# Patient Record
Sex: MALE | Race: WHITE | NOT HISPANIC OR LATINO
[De-identification: names, ages, dates, MRNs, and addresses within clinical notes are randomized per-mention and may not be internally consistent; named-entity substitution may affect disease eponyms.]

---

## 2017-09-27 ENCOUNTER — FORM ENCOUNTER (OUTPATIENT)
Age: 72
End: 2017-09-27

## 2017-09-28 ENCOUNTER — APPOINTMENT (OUTPATIENT)
Dept: ORTHOPEDIC SURGERY | Facility: CLINIC | Age: 72
End: 2017-09-28
Payer: MEDICAID

## 2017-09-28 ENCOUNTER — OUTPATIENT (OUTPATIENT)
Dept: OUTPATIENT SERVICES | Facility: HOSPITAL | Age: 72
LOS: 1 days | End: 2017-09-28
Payer: MEDICARE

## 2017-09-28 VITALS
WEIGHT: 195 LBS | SYSTOLIC BLOOD PRESSURE: 170 MMHG | BODY MASS INDEX: 31.34 KG/M2 | HEIGHT: 66 IN | DIASTOLIC BLOOD PRESSURE: 110 MMHG

## 2017-09-28 DIAGNOSIS — M65.9 SYNOVITIS AND TENOSYNOVITIS, UNSPECIFIED: ICD-10-CM

## 2017-09-28 DIAGNOSIS — Z87.39 PERSONAL HISTORY OF OTHER DISEASES OF THE MUSCULOSKELETAL SYSTEM AND CONNECTIVE TISSUE: ICD-10-CM

## 2017-09-28 DIAGNOSIS — Z87.19 PERSONAL HISTORY OF OTHER DISEASES OF THE DIGESTIVE SYSTEM: ICD-10-CM

## 2017-09-28 DIAGNOSIS — E78.5 HYPERLIPIDEMIA, UNSPECIFIED: ICD-10-CM

## 2017-09-28 DIAGNOSIS — I10 ESSENTIAL (PRIMARY) HYPERTENSION: ICD-10-CM

## 2017-09-28 DIAGNOSIS — Z89.612 ACQUIRED ABSENCE OF LEFT LEG ABOVE KNEE: ICD-10-CM

## 2017-09-28 DIAGNOSIS — Z85.118 PERSONAL HISTORY OF OTHER MALIGNANT NEOPLASM OF BRONCHUS AND LUNG: ICD-10-CM

## 2017-09-28 PROCEDURE — 20610 DRAIN/INJ JOINT/BURSA W/O US: CPT | Mod: RT

## 2017-09-28 PROCEDURE — 99203 OFFICE O/P NEW LOW 30 MIN: CPT | Mod: 25

## 2017-09-28 PROCEDURE — 73564 X-RAY EXAM KNEE 4 OR MORE: CPT

## 2017-09-28 PROCEDURE — 73564 X-RAY EXAM KNEE 4 OR MORE: CPT | Mod: 26,RT

## 2017-10-02 PROBLEM — I10 HYPERTENSION: Status: ACTIVE | Noted: 2017-09-28

## 2017-10-02 PROBLEM — E78.5 HYPERLIPIDEMIA: Status: ACTIVE | Noted: 2017-09-28

## 2017-10-02 PROBLEM — M65.9 SYNOVITIS: Status: ACTIVE | Noted: 2017-10-02

## 2017-10-02 PROBLEM — Z87.19 HISTORY OF DUODENAL ULCER: Status: RESOLVED | Noted: 2017-09-28 | Resolved: 2017-10-02

## 2017-10-02 PROBLEM — Z85.118 HISTORY OF MALIGNANT NEOPLASM OF LUNG: Status: RESOLVED | Noted: 2017-09-28 | Resolved: 2017-10-02

## 2017-10-02 PROBLEM — Z89.612 AMPUTATED LEFT LEG: Status: RESOLVED | Noted: 2017-09-28 | Resolved: 2017-10-02

## 2017-10-02 PROBLEM — Z87.39 HISTORY OF GOUT: Status: RESOLVED | Noted: 2017-09-28 | Resolved: 2017-10-02

## 2017-10-02 RX ORDER — LABETALOL HYDROCHLORIDE 200 MG/1
200 TABLET, FILM COATED ORAL
Qty: 90 | Refills: 0 | Status: ACTIVE | COMMUNITY
Start: 2017-01-04

## 2017-10-02 RX ORDER — ALLOPURINOL 300 MG/1
300 TABLET ORAL
Qty: 90 | Refills: 0 | Status: ACTIVE | COMMUNITY
Start: 2017-01-04

## 2017-10-02 RX ORDER — TAMSULOSIN HYDROCHLORIDE 0.4 MG/1
0.4 CAPSULE ORAL
Qty: 90 | Refills: 0 | Status: ACTIVE | COMMUNITY
Start: 2017-06-09

## 2017-10-02 RX ORDER — ATORVASTATIN CALCIUM 10 MG/1
10 TABLET, FILM COATED ORAL
Qty: 90 | Refills: 0 | Status: ACTIVE | COMMUNITY
Start: 2017-06-07

## 2017-10-11 ENCOUNTER — RX RENEWAL (OUTPATIENT)
Age: 72
End: 2017-10-11

## 2017-10-20 ENCOUNTER — APPOINTMENT (OUTPATIENT)
Dept: ORTHOPEDIC SURGERY | Facility: CLINIC | Age: 72
End: 2017-10-20
Payer: MEDICAID

## 2017-10-20 VITALS — WEIGHT: 195 LBS | HEIGHT: 66 IN | BODY MASS INDEX: 31.34 KG/M2

## 2017-10-20 PROCEDURE — 99214 OFFICE O/P EST MOD 30 MIN: CPT

## 2017-11-03 ENCOUNTER — APPOINTMENT (OUTPATIENT)
Dept: ORTHOPEDIC SURGERY | Facility: CLINIC | Age: 72
End: 2017-11-03
Payer: MEDICARE

## 2017-11-03 PROCEDURE — 20610 DRAIN/INJ JOINT/BURSA W/O US: CPT | Mod: RT

## 2017-11-03 RX ORDER — HYALURONATE SOD, CROSS-LINKED 30 MG/3 ML
30 SYRINGE (ML) INTRAARTICULAR
Qty: 3 | Refills: 0 | Status: COMPLETED | COMMUNITY
Start: 2017-10-23 | End: 2017-11-03

## 2017-11-28 ENCOUNTER — APPOINTMENT (OUTPATIENT)
Dept: ORTHOPEDIC SURGERY | Facility: CLINIC | Age: 72
End: 2017-11-28
Payer: MEDICARE

## 2017-11-28 VITALS
DIASTOLIC BLOOD PRESSURE: 90 MMHG | WEIGHT: 195 LBS | HEIGHT: 66 IN | SYSTOLIC BLOOD PRESSURE: 140 MMHG | BODY MASS INDEX: 31.34 KG/M2

## 2017-11-28 PROCEDURE — 99214 OFFICE O/P EST MOD 30 MIN: CPT

## 2018-01-18 ENCOUNTER — FORM ENCOUNTER (OUTPATIENT)
Age: 73
End: 2018-01-18

## 2018-01-19 ENCOUNTER — APPOINTMENT (OUTPATIENT)
Dept: ORTHOPEDIC SURGERY | Facility: CLINIC | Age: 73
End: 2018-01-19
Payer: MEDICARE

## 2018-01-19 ENCOUNTER — OUTPATIENT (OUTPATIENT)
Dept: OUTPATIENT SERVICES | Facility: HOSPITAL | Age: 73
LOS: 1 days | End: 2018-01-19
Payer: MEDICARE

## 2018-01-19 VITALS
BODY MASS INDEX: 31.98 KG/M2 | WEIGHT: 199 LBS | SYSTOLIC BLOOD PRESSURE: 120 MMHG | DIASTOLIC BLOOD PRESSURE: 80 MMHG | HEIGHT: 66 IN

## 2018-01-19 PROCEDURE — 99214 OFFICE O/P EST MOD 30 MIN: CPT

## 2018-01-19 PROCEDURE — 73564 X-RAY EXAM KNEE 4 OR MORE: CPT

## 2018-01-19 PROCEDURE — 73564 X-RAY EXAM KNEE 4 OR MORE: CPT | Mod: 26,RT

## 2018-01-19 RX ORDER — AMOXICILLIN 500 MG/1
500 CAPSULE ORAL
Qty: 56 | Refills: 0 | Status: DISCONTINUED | COMMUNITY
Start: 2017-08-21

## 2018-01-19 RX ORDER — CLARITHROMYCIN 500 MG/1
500 TABLET, FILM COATED ORAL
Qty: 28 | Refills: 0 | Status: DISCONTINUED | COMMUNITY
Start: 2017-08-21

## 2018-03-16 ENCOUNTER — APPOINTMENT (OUTPATIENT)
Dept: ORTHOPEDIC SURGERY | Facility: CLINIC | Age: 73
End: 2018-03-16
Payer: MEDICARE

## 2018-03-16 PROCEDURE — 99214 OFFICE O/P EST MOD 30 MIN: CPT | Mod: 25

## 2018-03-16 PROCEDURE — 20610 DRAIN/INJ JOINT/BURSA W/O US: CPT | Mod: RT

## 2018-05-25 ENCOUNTER — RX RENEWAL (OUTPATIENT)
Age: 73
End: 2018-05-25

## 2018-06-08 RX ORDER — HYALURONATE SOD, CROSS-LINKED 30 MG/3 ML
30 SYRINGE (ML) INTRAARTICULAR
Qty: 3 | Refills: 0 | Status: COMPLETED | COMMUNITY
Start: 2018-05-25 | End: 2018-06-08

## 2018-07-06 ENCOUNTER — APPOINTMENT (OUTPATIENT)
Dept: ORTHOPEDIC SURGERY | Facility: CLINIC | Age: 73
End: 2018-07-06
Payer: MEDICARE

## 2018-07-06 VITALS — WEIGHT: 200 LBS | HEIGHT: 66 IN | BODY MASS INDEX: 32.14 KG/M2

## 2018-07-06 PROCEDURE — 99214 OFFICE O/P EST MOD 30 MIN: CPT

## 2018-09-19 ENCOUNTER — APPOINTMENT (OUTPATIENT)
Dept: ORTHOPEDIC SURGERY | Facility: CLINIC | Age: 73
End: 2018-09-19
Payer: MEDICARE

## 2018-09-19 PROCEDURE — 20610 DRAIN/INJ JOINT/BURSA W/O US: CPT | Mod: RT

## 2018-11-21 ENCOUNTER — APPOINTMENT (OUTPATIENT)
Dept: UROLOGY | Facility: CLINIC | Age: 73
End: 2018-11-21
Payer: MEDICARE

## 2018-11-21 VITALS — SYSTOLIC BLOOD PRESSURE: 158 MMHG | HEART RATE: 93 BPM | TEMPERATURE: 98.6 F | DIASTOLIC BLOOD PRESSURE: 82 MMHG

## 2018-11-21 DIAGNOSIS — R36.1 HEMATOSPERMIA: ICD-10-CM

## 2018-11-21 DIAGNOSIS — N40.0 BENIGN PROSTATIC HYPERPLASIA WITHOUT LOWER URINARY TRACT SYMPMS: ICD-10-CM

## 2018-11-21 PROCEDURE — 99204 OFFICE O/P NEW MOD 45 MIN: CPT

## 2018-11-25 LAB
APPEARANCE: CLEAR
BACTERIA UR CULT: NORMAL
BACTERIA: NEGATIVE
BILIRUBIN URINE: NEGATIVE
BLOOD URINE: NEGATIVE
COLOR: YELLOW
GLUCOSE QUALITATIVE U: NEGATIVE MG/DL
HYALINE CASTS: 0 /LPF
KETONES URINE: NEGATIVE
LEUKOCYTE ESTERASE URINE: NEGATIVE
MICROSCOPIC-UA: NORMAL
NITRITE URINE: NEGATIVE
PH URINE: 5.5
PROTEIN URINE: NEGATIVE MG/DL
PSA FREE FLD-MCNC: 29.7
PSA FREE SERPL-MCNC: 0.9 NG/ML
PSA SERPL-MCNC: 3.03 NG/ML
RED BLOOD CELLS URINE: 0 /HPF
SPECIFIC GRAVITY URINE: 1.01
SQUAMOUS EPITHELIAL CELLS: 0 /HPF
UROBILINOGEN URINE: NEGATIVE MG/DL
WHITE BLOOD CELLS URINE: 0 /HPF

## 2018-11-26 ENCOUNTER — RESULT REVIEW (OUTPATIENT)
Age: 73
End: 2018-11-26

## 2018-11-28 ENCOUNTER — MOBILE ON CALL (OUTPATIENT)
Age: 73
End: 2018-11-28

## 2019-01-09 ENCOUNTER — OTHER (OUTPATIENT)
Age: 74
End: 2019-01-09

## 2019-02-21 ENCOUNTER — RX RENEWAL (OUTPATIENT)
Age: 74
End: 2019-02-21

## 2019-03-11 ENCOUNTER — APPOINTMENT (OUTPATIENT)
Dept: UROLOGY | Facility: CLINIC | Age: 74
End: 2019-03-11
Payer: MEDICARE

## 2019-03-11 VITALS — DIASTOLIC BLOOD PRESSURE: 84 MMHG | TEMPERATURE: 98.1 F | SYSTOLIC BLOOD PRESSURE: 164 MMHG | HEART RATE: 76 BPM

## 2019-03-11 DIAGNOSIS — N52.9 MALE ERECTILE DYSFUNCTION, UNSPECIFIED: ICD-10-CM

## 2019-03-11 PROCEDURE — 99213 OFFICE O/P EST LOW 20 MIN: CPT

## 2019-03-11 RX ORDER — SILDENAFIL 50 MG/1
50 TABLET ORAL
Qty: 6 | Refills: 5 | Status: DISCONTINUED | COMMUNITY
Start: 2019-01-09 | End: 2019-03-11

## 2019-03-11 NOTE — HISTORY OF PRESENT ILLNESS
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 No painful ejaculation, no hematuria, dysuria. Unknown last PSA, had a negative TRUS biopsy in 2015 but complicated by severe sepsis with prolonged [facility] admission under Dr. Alex. Overall feels well but concerned about the blood in semen, previously saw Dr. Luque for the same complaint who offered reassurance but is here for second opinion.\par One episode of kidney stone, passed on its own - 10 years ago, overall satisfied with his urination, some weaker stream, post-void dribbling and nocturia but no frequency, urgency or incontinence.\par No history of  cancers.\par \par 3/11/19 Here for f/u. Last seen in November. Never filled Viagra due to cost. PSA was 3.03. No further hematospermia but experiencing retrograde ejaculation likely from tamsulosin. LUTS well controlled. [Nocturia] : no nocturia [Erectile Dysfunction] : Erectile Dysfunction [None] : None

## 2019-03-11 NOTE — LETTER BODY
[Dear  ___] : Dear  [unfilled], [Courtesy Letter:] : I had the pleasure of seeing your patient, [unfilled], in my office today. [Please see my note below.] : Please see my note below. [Consult Closing:] : Thank you very much for allowing me to participate in the care of this patient.  If you have any questions, please do not hesitate to contact me. [Sincerely,] : Sincerely, [FreeTextEntry2] : Jean Wong MD\par [address] [facility], [address]\par [place], [place] [id number] [FreeTextEntry3] : Rhys Hebert MD\par Urologic Oncology\par Department of Urology\par [facility]\par \par [employer] [facility] [employer] [facility] \par \par

## 2019-03-11 NOTE — ASSESSMENT
[FreeTextEntry1] : 73M hx sarcoma, LLE prosthesis, BPH, Elevated PSA s/p negative biopsy (2015) c/b severe sepsis now with hematospermia - likely benign etiology\par Repeat PSA today\par Discussed retrograde ejaculation as caused by tamsulosin. He would like to continue medication for now\par Sildenafil 20mg prescribed \par F/u 6 months or sooner prn

## 2019-03-11 NOTE — PHYSICAL EXAM
[General Appearance - Well Developed] : well developed [General Appearance - Well Nourished] : well nourished [Normal Appearance] : normal appearance [Well Groomed] : well groomed [General Appearance - In No Acute Distress] : no acute distress [Abdomen Soft] : soft [Abdomen Tenderness] : non-tender [Costovertebral Angle Tenderness] : no ~M costovertebral angle tenderness [Oriented To Time, Place, And Person] : oriented to person, place, and time [Affect] : the affect was normal [Mood] : the mood was normal [Not Anxious] : not anxious [FreeTextEntry1] : left leg prosthesis [No Focal Deficits] : no focal deficits

## 2019-03-12 LAB
PSA FREE FLD-MCNC: 27 %
PSA FREE SERPL-MCNC: 0.95 NG/ML
PSA SERPL-MCNC: 3.48 NG/ML

## 2019-03-14 ENCOUNTER — RESULT REVIEW (OUTPATIENT)
Age: 74
End: 2019-03-14

## 2019-03-29 ENCOUNTER — APPOINTMENT (OUTPATIENT)
Dept: ORTHOPEDIC SURGERY | Facility: CLINIC | Age: 74
End: 2019-03-29
Payer: MEDICARE

## 2019-03-29 VITALS — BODY MASS INDEX: 31.34 KG/M2 | WEIGHT: 195 LBS | HEIGHT: 66 IN

## 2019-03-29 DIAGNOSIS — M75.41 IMPINGEMENT SYNDROME OF RIGHT SHOULDER: ICD-10-CM

## 2019-03-29 PROCEDURE — 99214 OFFICE O/P EST MOD 30 MIN: CPT

## 2019-03-29 RX ORDER — CANDESARTAN CILEXETIL 32 MG/1
32 TABLET ORAL
Refills: 0 | Status: ACTIVE | COMMUNITY

## 2019-03-29 RX ORDER — CANDESARTAN CILEXETIL 32 MG/1
32 TABLET ORAL
Qty: 90 | Refills: 0 | Status: DISCONTINUED | COMMUNITY
Start: 2017-06-07 | End: 2019-03-29

## 2019-03-29 RX ORDER — HYALURONATE SOD, CROSS-LINKED 30 MG/3 ML
30 SYRINGE (ML) INTRAARTICULAR
Refills: 0 | Status: DISCONTINUED | COMMUNITY
End: 2019-03-29

## 2019-03-29 RX ORDER — HYALURONATE SOD, CROSS-LINKED 30 MG/3 ML
30 SYRINGE (ML) INTRAARTICULAR
Qty: 1 | Refills: 0 | Status: DISCONTINUED | COMMUNITY
Start: 2019-02-21 | End: 2019-03-29

## 2019-04-04 NOTE — HISTORY OF PRESENT ILLNESS
[de-identified] : Mr. Barnes developed pain in her nondominant left shoulder 3 weeks ago without a specific injury. He denies night pain but does have pain with activities of daily living requiring overhead use. [place] currently takes no medicines for pain.

## 2019-04-04 NOTE — CONSULT LETTER
[Dear  ___] : Dear  [unfilled], [FreeTextEntry1] : Today I had the pleasure of evaluating your very nice patient QUINTEN KELLER  who requested that I share my findings with you. I very much appreciate the referral. \par \par Please review my office note below and, needless to say, please call or email me with any questions or concerns.\par \par I appreciate the opportunity to participate in his care.\par \par Sincerely,\par \par Jean Alvarez MD\par Director, Orthopaedic Surgery\par and Orthopaedic Strategic Initiatives \par [facility]\par Office: [phone]\par Cell: [phone]\par Email: [email or web address]\par Website: [email or web address] \par \par \par \par \par

## 2019-04-04 NOTE — DISCUSSION/SUMMARY
[Medication Risks Reviewed] : Medication risks reviewed [Surgical risks reviewed] : Surgical risks reviewed [de-identified] : Using a plastic shoulder model, I reviewed the anatomy of the rotator cuff muscles and the glenohumeral joint. I then explained the pathophysiology of impingement syndrome, commonly referred to as bursitis of the shoulder, and the fact that 80% of patients respond to nonoperative treatment.\par \par I then reviewed the nonoperative treatment program which includes activity modification to avoid those activities that exacerbate shoulder pain, Tylenol or anti-inflammatory medications as necessary, rehabilitation exercises focusing on shoulder stretching and rotator cuff muscle strengthening, either independently or with a physical therapist, and, a subacromial cortisone injection for those patients who remain symptomatic following an exercise program. \par \par Finally, I explained that those patients who remain symptomatic after a minimum of 3-4 months of nonoperative treatment and continue to have shoulder pain at night that interrupts sleep and compromises activities of everyday life or sporting activities requiring overhead use are candidates for surgical treatment.\par \par I then gave instructions in a home exercise program for the rotator cuff muscles emphasizing stretching and strengthening of the shoulder and also gave my home exercise sheet which includes illustrations of the exercises.\par I advised him will to take Aleve and Tylenol as necessary for pain relief and to return to my repeat evaluation in 6 weeks if she remains symptomatic at which time I would recommend a subacromial cortisone injection.\par \par Today her clinical left shoulder [employer] score is 56 on a scale of 100 indicating poor shoulder function.\par \par

## 2019-04-30 ENCOUNTER — APPOINTMENT (OUTPATIENT)
Dept: ORTHOPEDIC SURGERY | Facility: CLINIC | Age: 74
End: 2019-04-30
Payer: MEDICARE

## 2019-04-30 PROCEDURE — 20610 DRAIN/INJ JOINT/BURSA W/O US: CPT | Mod: RT

## 2019-05-06 NOTE — PROCEDURE
[de-identified] : Indication: right knee pain and stiffness\par DX: Right knee DJD \par \par Under strict sterile technique,  the right  knee was prepped with Betadine. Using the superolateral approach, with the patient supine, a 4mL injection of GelOne was administered intra-articularly. The patient tolerated the procedure well. The patient was instructed to avoid vigorous exercise for 48 hours and will apply ice to the knee for 20 minutes 2-3 times per day if discomfort occurs. Patient will return on an as needed basis. The patient will call if any questions or problems should arise. \par \par Gel- One injection - Right Knee\par Lot #: [id number]\par Exp: 02-\par Man:  Demetrius\par NDC: 05218-20500-0

## 2019-09-04 ENCOUNTER — APPOINTMENT (OUTPATIENT)
Dept: UROLOGY | Facility: CLINIC | Age: 74
End: 2019-09-04

## 2019-10-15 ENCOUNTER — RX RENEWAL (OUTPATIENT)
Age: 74
End: 2019-10-15

## 2019-11-08 ENCOUNTER — OUTPATIENT (OUTPATIENT)
Dept: OUTPATIENT SERVICES | Facility: HOSPITAL | Age: 74
LOS: 1 days | End: 2019-11-08
Payer: MEDICARE

## 2019-11-08 PROCEDURE — 73030 X-RAY EXAM OF SHOULDER: CPT | Mod: 26,RT

## 2019-11-08 PROCEDURE — 73030 X-RAY EXAM OF SHOULDER: CPT

## 2019-11-14 ENCOUNTER — APPOINTMENT (OUTPATIENT)
Dept: ORTHOPEDIC SURGERY | Facility: CLINIC | Age: 74
End: 2019-11-14
Payer: MEDICARE

## 2019-11-14 PROCEDURE — 20610 DRAIN/INJ JOINT/BURSA W/O US: CPT | Mod: RT

## 2019-11-14 RX ORDER — HYALURONATE SOD, CROSS-LINKED 30 MG/3 ML
30 SYRINGE (ML) INTRAARTICULAR
Qty: 1 | Refills: 0 | Status: DISCONTINUED | COMMUNITY
Start: 2019-10-15 | End: 2019-11-14

## 2019-11-18 NOTE — PROCEDURE
[de-identified] : Indication: Right knee pain and stiffness\par \par Under strict sterile technique, the right  knee was prepped with Betadine. Using the superolateral approach, with the patient supine, 20 ccs of clear yellow fluid was aspirated and a 3mL injection of GelOne was administered intra-articularly. The patient tolerated the procedure well. The patient was instructed to avoid vigorous exercise for 48 hours and will apply ice to the knee for 20 minutes 2-3 times per day if discomfort occurs. Patient will return on an as needed basis. The patient will call if any questions or problems should arise. \par \par Gel- One injection - Right knee joint\par Lot #: [id number]\par Exp: 08-\par Man:Demetrius\par NDC: 42878-57641-4

## 2019-12-17 ENCOUNTER — APPOINTMENT (OUTPATIENT)
Dept: ORTHOPEDIC SURGERY | Facility: CLINIC | Age: 74
End: 2019-12-17
Payer: MEDICARE

## 2019-12-17 VITALS
HEART RATE: 73 BPM | OXYGEN SATURATION: 98 % | RESPIRATION RATE: 16 BRPM | WEIGHT: 195 LBS | SYSTOLIC BLOOD PRESSURE: 132 MMHG | DIASTOLIC BLOOD PRESSURE: 96 MMHG | BODY MASS INDEX: 31.34 KG/M2 | HEIGHT: 66 IN

## 2019-12-17 PROCEDURE — 99213 OFFICE O/P EST LOW 20 MIN: CPT | Mod: 25

## 2019-12-17 PROCEDURE — 20610 DRAIN/INJ JOINT/BURSA W/O US: CPT | Mod: RT

## 2020-01-06 NOTE — DISCUSSION/SUMMARY
[de-identified] : Mr Barnes had his knee aspirated and received a cortisone injection today. He will return in one month for reevaluation. All questions were answered. He will call if any issues arise.

## 2020-01-06 NOTE — END OF VISIT
[FreeTextEntry3] : All medical record entries made by JP Hubbard, acting as a scribe for this encounter under the direction of Shakeel Cazares MD . I have reviewed the chart and agree that the record accurately reflects my personal performance of the history, physical exam, assessment and plan. I have also personally directed, reviewed, and agreed with the chart. \par \par

## 2020-01-06 NOTE — PROCEDURE
[de-identified] : Under strict sterile technique, the right knee was prepped with Betadine. Using the superolateral approach, with the patient supine,20mls of clear yellow fluid was aspirated from the knee. Then, 1ml of Kenalog was mixed with 5mls of 1% Lidocaine and 5mLs of 0.5% Marcaine, and was injected intraarticularly. The patient tolerated the procedure well. The patient was instructed to avoid vigorous exercise for 24 hours and will apply ice to the knee for 20 minutes 2-3 times per day if discomfort occurs. Patient will return on an as needed basis. The patient will call if any questions or problems should arise

## 2020-01-06 NOTE — PHYSICAL EXAM
[de-identified] : The patient is a well developed, well nourished male in no apparent distress. He is alert and oriented X 3 with a pleasant mood and appropriate affect. \par \par On physical examination of the right knee, his ROM is 0 to 125 degrees The patient walks with a limp.There is 1+ effusion. No warmth or erythema is noted. The patella is non tender to palpation medially or laterally. There is no crepitus noted. The apprehension and grind tests are negative. The extensor mechanism is intact. There is mild medial joint line tenderness. The Mason sign is positive. The Lachman and pivot shift tests are negative. There is no varus or valgus laxity at 0 or 30 degrees. No posterolateral or anteromedial laxity is noted. No masses are palpable. No other soft tissue or bony tenderness is noted. Quadriceps weakness is noted. Neurovascular function is intact.

## 2020-01-06 NOTE — HISTORY OF PRESENT ILLNESS
[de-identified] : Martin Barnes returns today for evaluation of his right knee. He reports persistent tightness and swelling following his HA injection. he has had difficulty walking and pain with stairs. He denies any fevers or chills. He denies any redness or warmth. \par

## 2020-07-09 ENCOUNTER — APPOINTMENT (OUTPATIENT)
Dept: ORTHOPEDIC SURGERY | Facility: CLINIC | Age: 75
End: 2020-07-09

## 2020-08-13 ENCOUNTER — APPOINTMENT (OUTPATIENT)
Dept: ORTHOPEDIC SURGERY | Facility: CLINIC | Age: 75
End: 2020-08-13
Payer: MEDICARE

## 2020-08-13 VITALS
BODY MASS INDEX: 31.34 KG/M2 | SYSTOLIC BLOOD PRESSURE: 140 MMHG | DIASTOLIC BLOOD PRESSURE: 80 MMHG | HEART RATE: 86 BPM | TEMPERATURE: 97.9 F | OXYGEN SATURATION: 96 % | WEIGHT: 195 LBS | HEIGHT: 66 IN

## 2020-08-13 PROCEDURE — 20610 DRAIN/INJ JOINT/BURSA W/O US: CPT | Mod: RT

## 2020-08-13 RX ORDER — HYALURONATE SOD, CROSS-LINKED 30 MG/3 ML
30 SYRINGE (ML) INTRAARTICULAR
Qty: 1 | Refills: 0 | Status: COMPLETED | COMMUNITY
Start: 2020-05-18 | End: 2020-08-13

## 2020-08-14 NOTE — PROCEDURE
[de-identified] : Indication: Pain and stiffness of the right knee\par \par Under strict sterile technique, the right  knee was prepped with Betadine. Using the superolateral approach, with the patient supine, a 3mL injection of gELOne was administered intra-articularly. The patient tolerated the procedure well. The patient was instructed to avoid vigorous exercise for 48 hours and will apply ice to the knee for 20 minutes 2-3 times per day if discomfort occurs. Patient will return on an as needed basis. The patient will call if any questions or problems should arise.\par \par Gel-One injection - Right knee joint\par Lot #: [id number]\par Exp: 4/19/2021\par Man: Demetrius\par NDC: 26609-91231-1\par

## 2020-12-22 ENCOUNTER — APPOINTMENT (OUTPATIENT)
Dept: ORTHOPEDIC SURGERY | Facility: CLINIC | Age: 75
End: 2020-12-22
Payer: MEDICARE

## 2020-12-22 VITALS
SYSTOLIC BLOOD PRESSURE: 140 MMHG | BODY MASS INDEX: 31.34 KG/M2 | DIASTOLIC BLOOD PRESSURE: 80 MMHG | WEIGHT: 195 LBS | TEMPERATURE: 97.7 F | HEIGHT: 66 IN | HEART RATE: 95 BPM | OXYGEN SATURATION: 97 %

## 2020-12-22 PROCEDURE — 99213 OFFICE O/P EST LOW 20 MIN: CPT

## 2020-12-22 PROCEDURE — 99072 ADDL SUPL MATRL&STAF TM PHE: CPT

## 2020-12-23 NOTE — END OF VISIT
[FreeTextEntry3] : All medical record entries made by JP Hubbard, acting as a scribe for this encounter under the direction of Shakeel Cazares MD . I have reviewed the chart and agree that the record accurately reflects my personal performance of the history, physical exam, assessment and plan. I have also personally directed, reviewed, and agreed with the chart.

## 2020-12-23 NOTE — DISCUSSION/SUMMARY
[de-identified] : Mr Barnes is doing quite well and does not require any interventions today. We will order his HA injection and he will return in February for the next HA. He will continue on with activities as tolerated. He will call if any issues arise.

## 2020-12-23 NOTE — HISTORY OF PRESENT ILLNESS
[de-identified] : Mr Barnes returns today for evalaution of his right knee. He has medial compartment DJD and has managed well with HA and cortisone in the past. He reports that he has been doing quite well. He has a dull achy pain which is minimal. He denies any swelling, locking or buckling

## 2020-12-23 NOTE — PHYSICAL EXAM
[de-identified] : The patient is a well developed, well nourished male in no apparent distress. He is alert and oriented X 3 with a pleasant mood and appropriate affect. \par \par On physical examination of the right knee, his ROM is 0 to 125 degrees The patient walks with a limp.There is scant  effusion. No warmth or erythema is noted. The patella is non tender to palpation medially or laterally. There is no crepitus noted. The apprehension and grind tests are negative. The extensor mechanism is intact. There is mild medial joint line tenderness. The Mason sign is positive. The Lachman and pivot shift tests are negative. There is no varus or valgus laxity at 0 or 30 degrees. No posterolateral or anteromedial laxity is noted. No masses are palpable. No other soft tissue or bony tenderness is noted. Quadriceps weakness is noted. Neurovascular function is intact.

## 2021-04-29 ENCOUNTER — APPOINTMENT (OUTPATIENT)
Dept: ORTHOPEDIC SURGERY | Facility: CLINIC | Age: 76
End: 2021-04-29
Payer: MEDICARE

## 2021-04-29 VITALS
WEIGHT: 195 LBS | BODY MASS INDEX: 31.34 KG/M2 | HEART RATE: 72 BPM | HEIGHT: 66 IN | OXYGEN SATURATION: 96 % | SYSTOLIC BLOOD PRESSURE: 128 MMHG | TEMPERATURE: 97.3 F | DIASTOLIC BLOOD PRESSURE: 79 MMHG

## 2021-04-29 PROCEDURE — 20610 DRAIN/INJ JOINT/BURSA W/O US: CPT | Mod: RT

## 2021-04-29 PROCEDURE — 99072 ADDL SUPL MATRL&STAF TM PHE: CPT

## 2021-04-29 RX ORDER — HYALURONATE SOD, CROSS-LINKED 30 MG/3 ML
30 SYRINGE (ML) INTRAARTICULAR
Qty: 1 | Refills: 0 | Status: COMPLETED | COMMUNITY
Start: 2020-12-23 | End: 2021-04-29

## 2021-04-30 NOTE — PROCEDURE
[de-identified] : cc: right knee pain and stiffness\par dx; right knee DJD\par \par Under strict sterile technique, the right knee was prepped with Betadine. Using the superolateral approach, with the patient supine, a 3mL injection of GelOne was administered intra-articularly. The patient tolerated the procedure well. The patient was instructed to avoid vigorous exercise for 48 hours and will apply ice to the knee for 20 minutes 2-3 times per day if discomfort occurs. Patient will return on an as needed basis. The patient will call if any questions or problems should arise.\par \par Gel-One injection - Right knee joint\par Lot #: [id number]\par Exp: 01-\par Man: Demetrius\par NDC: 37217-18665-5

## 2021-07-15 ENCOUNTER — APPOINTMENT (OUTPATIENT)
Dept: ORTHOPEDIC SURGERY | Facility: CLINIC | Age: 76
End: 2021-07-15
Payer: MEDICARE

## 2021-07-15 PROCEDURE — 99213 OFFICE O/P EST LOW 20 MIN: CPT

## 2021-07-15 PROCEDURE — 99072 ADDL SUPL MATRL&STAF TM PHE: CPT

## 2021-07-15 NOTE — HISTORY OF PRESENT ILLNESS
[de-identified] : Matrin returns today for evaluation of his right knee. 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 He rested and is now pain free. He denies any locking or buckling.

## 2021-07-15 NOTE — PHYSICAL EXAM
[de-identified] : The patient is a well developed, well nourished male in no apparent distress. He is alert and oriented X 3 with a pleasant mood and appropriate affect. \par \par On physical examination of the right knee, his ROM is 0 to 125 degrees The patient walks with a limp.There is scant  effusion. No warmth or erythema is noted. The patella is non tender to palpation medially or laterally. There is no crepitus noted. The apprehension and grind tests are negative. The extensor mechanism is intact. There is mild medial joint line tenderness. The Mason sign is positive. The Lachman and pivot shift tests are negative. There is no varus or valgus laxity at 0 or 30 degrees. No posterolateral or anteromedial laxity is noted. No masses are palpable. No other soft tissue or bony tenderness is noted. Quadriceps weakness is noted. Neurovascular function is intact.

## 2021-07-15 NOTE — DISCUSSION/SUMMARY
[de-identified] : Martin had a flare of his right knee arthritis. He is asymptomatic at this point and he will continue on with activities as tolerated. All questions were answered. He will call if any issues arise.

## 2021-08-16 ENCOUNTER — APPOINTMENT (OUTPATIENT)
Dept: PULMONOLOGY | Facility: CLINIC | Age: 76
End: 2021-08-16
Payer: MEDICARE

## 2021-08-16 VITALS
SYSTOLIC BLOOD PRESSURE: 150 MMHG | TEMPERATURE: 96.7 F | HEIGHT: 67 IN | OXYGEN SATURATION: 97 % | BODY MASS INDEX: 31.55 KG/M2 | HEART RATE: 78 BPM | WEIGHT: 201 LBS | DIASTOLIC BLOOD PRESSURE: 90 MMHG | RESPIRATION RATE: 15 BRPM

## 2021-08-16 PROCEDURE — 99204 OFFICE O/P NEW MOD 45 MIN: CPT

## 2021-08-16 NOTE — REASON FOR VISIT
[facility] Dermatology Note      Dermatology Problem List:  1. Extensive large condyloma acuminata with concern for malignant transformation diffusely distributed in the groin and genital area, in context of HIV (VL undetectable; CD4 >1197; on antiretrovirals)              - multiple shave biopsies performed 3/4/19 (no SCCis identified), 4/5/19 on largest lesions              - imiquimod started 3/4/19              - s/p extensive cryotherapy 8/12/19    Encounter Date: Aug 12, 2019    CC:  Chief Complaint   Patient presents with     Derm Problem     Sara is here today for a follow up for his warts that are getting better.          History of Present Illness:  Mr. Sara Benjamin is a 50 year old male who presents as a follow-up for condyloma acuminata on the penis, scrotum, groin, and left postauricular sulcus with concern for malignant transformation. The patient was last seen 4/5/19 when three biopsies were taken, and he continued topical imiquimod. Today, the patient reports that he has been applying the topical imiquimod, and he has been noticing improvement of the lesions. He has not noticed any new lesions developing since his last visit. He has run out of imiquimod and would like a refill. The patient voices no other concerns.     Past Medical History:   Patient Active Problem List   Diagnosis     Condyloma acuminata     Past Medical History:   Diagnosis Date     Condyloma acuminata      HIV (human immunodeficiency virus infection) (H)     on Biktarvy     No past surgical history on file.    Social History:  Patient reports that he has quit smoking. He has never used smokeless tobacco.    Family History:  No family history on file.    Medications:  Current Outpatient Medications   Medication Sig Dispense Refill     bictegravir-emtricitabine-tenofovir (BIKTARVY) -25 MG per tablet Take 1 tablet by mouth daily 30 tablet 11     imiquimod (ALDARA) 5 % external cream Apply topically three  times a week 48 packet 3     SENNA-docusate sodium (SENNA S) 8.6-50 MG tablet Take 1 tablet by mouth 2 times daily 60 tablet 11       No Known Allergies    Review of Systems:  -Constitutional: Otherwise feeling well today, in usual state of health.  -HEENT: Patient denies nonhealing oral sores.  -Skin: As above in HPI. No additional skin concerns.    Physical exam:  Vitals: /77   Pulse 69   GEN: This is a well developed, well-nourished male in no acute distress, in a pleasant mood.    SKIN: Focused examination of the genital area and L postauricular area was performed.  -Granger skin type: IV  -Numerous large hyperpigmented verrucous plaques on the scrotum, shaft of the penis, groin, and left postauricular sulcus with marked interval improvement of the lesions on the inferior abdomen and groin  -No other lesions of concern on areas examined.       Impression/Plan:  1. Extensive and very large condyloma acuminata diffusely distributed on the groin, genital area, and left postauricular sulcus in the context of HIV. Much improved on exam today.    We discussed the risks and benefits of treatment with cryotherapy and continued treatment with imiquimod. The patient is agreeable with this plan.    Cryotherapy procedure note: After verbal consent and discussion of risks and benefits including but no limited to dyspigmentation/scar, blister, and pain, 18 was(were) treated with 1-2mm freeze border for 2 cycles with liquid nitrogen. Post cryotherapy instructions were provided.    Continue imiquimod 5% cream daily five times a week.    CC Bartolo Amor MD  [address] [place] [address] on close of this encounter.  Follow-up in 4 weeks, earlier for new or changing lesions.       Staff Involved:  Scribe/Staff    Scribe Disclosure  I, Dominic Najjar, am serving as a scribe to document services personally performed by Dr. Guillermo Stanley MD, based on data collection and the provider's statements to  me.    Provider Disclosure:   The documentation recorded by the scribe accurately reflects the services I personally performed and the decisions made by me.    Guillermo Stanley MD   of Dermatology  Department of Dermatology  [facility]        [Consultation] : a consultation

## 2021-08-25 ENCOUNTER — APPOINTMENT (OUTPATIENT)
Dept: SLEEP CENTER | Facility: HOME HEALTH | Age: 76
End: 2021-08-25
Payer: MEDICARE

## 2021-08-25 ENCOUNTER — OUTPATIENT (OUTPATIENT)
Dept: OUTPATIENT SERVICES | Facility: HOSPITAL | Age: 76
LOS: 1 days | End: 2021-08-25
Payer: MEDICARE

## 2021-08-25 PROCEDURE — 95800 SLP STDY UNATTENDED: CPT

## 2021-08-25 PROCEDURE — 95800 SLP STDY UNATTENDED: CPT | Mod: 26

## 2021-08-26 DIAGNOSIS — G47.33 OBSTRUCTIVE SLEEP APNEA (ADULT) (PEDIATRIC): ICD-10-CM

## 2021-09-01 NOTE — CONSULT LETTER
[Dear  ___] : Dear  [unfilled], [( Thank you for referring [unfilled] for consultation for _____ )] : Thank you for referring [unfilled] for consultation for [unfilled] [Please see my note below.] : Please see my note below. [Consult Closing:] : Thank you very much for allowing me to participate in the care of this patient.  If you have any questions, please do not hesitate to contact me. [Sincerely,] : Sincerely, [FreeTextEntry2] : Jean Wong MD\par [address]	\[place] [address] [FreeTextEntry3] : Beryl Echavarria MD, FCCP\par

## 2021-09-01 NOTE — HISTORY OF PRESENT ILLNESS
[TextBox_4] : 08/16/2021: Asked to evaluate patient by Dr Wong for sleep apnea. He was prescribed CPAP Jan 2017 by Dr Jeffrey. They recall that his sleep apnea was "bad" but he doesn't know the details. He was sent home from the hospital with orders for CPAP - he does not recall doing a PSG. He did snore prior to CPAP, did wake up gasping, did have witnessed apneas, did have daytime sleepiness. This has all resolved on CPAP. He uses it nightly and benefits symptomatically. His CPAP is from [facility]. 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

## 2021-09-09 ENCOUNTER — APPOINTMENT (OUTPATIENT)
Dept: UROLOGY | Facility: CLINIC | Age: 76
End: 2021-09-09
Payer: MEDICARE

## 2021-09-09 VITALS — HEART RATE: 71 BPM | OXYGEN SATURATION: 98 % | TEMPERATURE: 98 F

## 2021-09-09 PROCEDURE — 99214 OFFICE O/P EST MOD 30 MIN: CPT

## 2021-09-09 NOTE — ASSESSMENT
[FreeTextEntry1] : PSA screening\par \par Patient with slight rise in PSA over last two values, but minimal, with low overall risk and prior negative biopsy. \par \par Given low overall risk (, no family history, age), and prior negative, plan on observation\par \par Discussed pros/cons of biopsy, MRI, risks of occult PCa, risks of metastasis or death\par \par He agrees\par Plan for PSA in 6 months, and if stable, then in a year

## 2021-09-09 NOTE — HISTORY OF PRESENT ILLNESS
[FreeTextEntry1] : CC: BPH, ED\par \par HPI: 72M hx LLE sarcoma and lung masses s/p LLE amputation, right lung lobectomy (1973) and BPH, ED, elevated PSA.\par \par PSA history\par Nov 2018 3.03\par March 2019 3.48\par August 2021 4.3\par \par Patient had a negative TRUS biopsy in 2015 but complicated by severe sepsis with prolonged [facility] admission under Dr. Alex. No history prostate MRI.\par \par BPH/LUTS\par Patient takes flomax and saw palmetto. Patient reports good stream, but not "strong." Some post void dribbling, but generally feels like empties bladder well. no frequency, urgency or incontinence. Generally pleased. \par \par ED: Able to have erections, but not as strong. + retrograde ejaculation. Prescribed sildenafil in the past, did not try.\par \par One episode of kidney stone, passed on its own  ~20 years ago. Nothing since.\par \par FAMHX: No history of  cancers.\par SURGHX: lobectomy, leg amputation\par SOCIAL: , 2 kids, quit smoking >50 years ago, 2-3 glasses wine/week\par ROS: negative\par

## 2021-12-09 ENCOUNTER — APPOINTMENT (OUTPATIENT)
Dept: UROLOGY | Facility: CLINIC | Age: 76
End: 2021-12-09
Payer: MEDICARE

## 2021-12-09 VITALS — SYSTOLIC BLOOD PRESSURE: 176 MMHG | DIASTOLIC BLOOD PRESSURE: 84 MMHG | TEMPERATURE: 97.2 F

## 2021-12-09 PROCEDURE — 99214 OFFICE O/P EST MOD 30 MIN: CPT

## 2021-12-09 NOTE — PHYSICAL EXAM
[General Appearance - Well Developed] : well developed [General Appearance - Well Nourished] : well nourished [Normal Appearance] : normal appearance [Well Groomed] : well groomed [General Appearance - In No Acute Distress] : no acute distress [Edema] : no peripheral edema [] : no respiratory distress [Respiration, Rhythm And Depth] : normal respiratory rhythm and effort [Exaggerated Use Of Accessory Muscles For Inspiration] : no accessory muscle use

## 2021-12-09 NOTE — ASSESSMENT
[FreeTextEntry1] : 75 yo with BPH and ED. On Flomax, but noticed slight worsening of symptoms. \par \par - Continue Flomax daily\par - Prescribed Tadalafil daily for BPH and ED\par - RTC in 3 month for symptom check and PSA check

## 2021-12-09 NOTE — LETTER BODY
[Dear  ___] : Dear  [unfilled], [Consult Letter:] : I had the pleasure of evaluating your patient, [unfilled]. [Please see my note below.] : Please see my note below. [Consult Closing:] : Thank you very much for allowing me to participate in the care of this patient.  If you have any questions, please do not hesitate to contact me. [Sincerely,] : Sincerely, [FreeTextEntry3] : Javi Sellers MD, FRCS \par  of Urology [facility]\par Director of Laparoscopic and Robotic Surgery \par [facility] Director of Urology, [facility] \par Professor of Urology\par \par (Office) [phone]\par (Cell)  [phone] \par [email or web address] \par \par

## 2021-12-09 NOTE — HISTORY OF PRESENT ILLNESS
[FreeTextEntry1] : CC: weakening stream\par \par HPI: 72M hx LLE sarcoma and lung masses s/p LLE amputation, right lung lobectomy (1973) and BPH, ED, elevated PSA.\par \par Nocturia 2-3x\par Slight worsening of flow/stream force; remains on tamsulosin 0.4 mg \par Denies any other associated symptoms\par \par PVR 3 today\par \par Has not taken his tadalafil for ED since last visit.\par PSA history\par Nov 2018 3.03\par March 2019 3.48\par August 2021 4.3 \par \par Patient had a negative TRUS biopsy in 2015 but complicated by severe sepsis with prolonged [facility] admission under Dr. Alex. No history prostate MRI.\par FAMHX: No history of  cancers.\par SURGHX: lobectomy, leg amputation\par SOCIAL: , 2 kids, quit smoking >50 years ago, 2-3 glasses wine/week\par ROS: negative\par \par

## 2021-12-10 RX ORDER — TADALAFIL 5 MG/1
5 TABLET ORAL
Qty: 90 | Refills: 3 | Status: ACTIVE | COMMUNITY
Start: 2021-09-09 | End: 1900-01-01

## 2021-12-13 ENCOUNTER — NON-APPOINTMENT (OUTPATIENT)
Age: 76
End: 2021-12-13

## 2022-02-08 ENCOUNTER — APPOINTMENT (OUTPATIENT)
Dept: ORTHOPEDIC SURGERY | Facility: CLINIC | Age: 77
End: 2022-02-08

## 2022-03-08 ENCOUNTER — RESULT REVIEW (OUTPATIENT)
Age: 77
End: 2022-03-08

## 2022-03-08 ENCOUNTER — OUTPATIENT (OUTPATIENT)
Dept: OUTPATIENT SERVICES | Facility: HOSPITAL | Age: 77
LOS: 1 days | End: 2022-03-08
Payer: MEDICARE

## 2022-03-08 ENCOUNTER — APPOINTMENT (OUTPATIENT)
Dept: ORTHOPEDIC SURGERY | Facility: CLINIC | Age: 77
End: 2022-03-08
Payer: MEDICARE

## 2022-03-08 PROCEDURE — 72050 X-RAY EXAM NECK SPINE 4/5VWS: CPT

## 2022-03-08 PROCEDURE — 20610 DRAIN/INJ JOINT/BURSA W/O US: CPT | Mod: RT

## 2022-03-08 PROCEDURE — 72050 X-RAY EXAM NECK SPINE 4/5VWS: CPT | Mod: 26

## 2022-03-08 RX ORDER — SILDENAFIL 20 MG/1
20 TABLET ORAL
Qty: 50 | Refills: 5 | Status: COMPLETED | COMMUNITY
Start: 2019-03-11 | End: 2022-03-08

## 2022-03-08 RX ORDER — HYALURONATE SOD, CROSS-LINKED 30 MG/3 ML
30 SYRINGE (ML) INTRAARTICULAR
Qty: 1 | Refills: 0 | Status: COMPLETED | COMMUNITY
Start: 2022-01-05 | End: 2022-03-08

## 2022-03-10 ENCOUNTER — APPOINTMENT (OUTPATIENT)
Dept: UROLOGY | Facility: CLINIC | Age: 77
End: 2022-03-10

## 2022-03-10 RX ORDER — HYDROCHLOROTHIAZIDE 25 MG/1
25 TABLET ORAL
Qty: 90 | Refills: 0 | Status: ACTIVE | COMMUNITY
Start: 2020-12-14

## 2022-03-10 RX ORDER — HYLAN G-F 20 16MG/2ML
48 SYRINGE (ML) INTRAARTICULAR
Qty: 6 | Refills: 0 | Status: COMPLETED | COMMUNITY
Start: 2022-01-06 | End: 2022-03-08

## 2022-03-10 NOTE — PROCEDURE
[de-identified] : cc: right knee pain and stiffness\par DX: right knee DJD \par \par Under strict sterile technique, the right  knee was prepped with Betadine. Using the superolateral approach, with the patient supine, a 3mL injection of GelOne was administered intra-articularly. The patient tolerated the procedure well. The patient was instructed to avoid vigorous exercise for 48 hours and will apply ice to the knee for 20 minutes 2-3 times per day if discomfort occurs. Patient will return on an as needed basis. The patient will call if any questions or problems should arise.\par \par Gel-One injection - Right knee joint\par Lot #: [id number]\par Exp: 08-\par Man: Demetrius\par NDC: [id number]\par \par Mr Adamson complained of neck stiffness and clicking. He was sent for radiographs of his C spine which shows spondylosis at c6-7. he will take advil as needed for pain and will see Dr George if symptoms increase

## 2022-08-11 ENCOUNTER — APPOINTMENT (OUTPATIENT)
Dept: PULMONOLOGY | Facility: CLINIC | Age: 77
End: 2022-08-11

## 2022-08-11 PROCEDURE — 99441: CPT | Mod: 95

## 2022-08-11 NOTE — ASSESSMENT
[FreeTextEntry1] : Data reviewed:\par \par HST 8/2021: severe ROBBIE w desats, AHI 45\par \par Impression:\par ROBBIE on CPAP\par \par Plan:\par He is using PAP nightly with relief of symptoms.\par Will send new autoPAP rx.

## 2022-08-11 NOTE — HISTORY OF PRESENT ILLNESS
[TextBox_4] : 08/16/2021: Asked to evaluate patient by Dr Wong for sleep apnea. He was prescribed CPAP Jan 2017 by Dr Jeffrey. They recall that his sleep apnea was "bad" but he doesn't know the details. He was sent home from the hospital with orders for CPAP - he does not recall doing a PSG. He did snore prior to CPAP, did wake up gasping, did have witnessed apneas, did have daytime sleepiness. This has all resolved on CPAP. He uses it nightly and benefits symptomatically. His CPAP is from [facility]. 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 The patient gives consent for telehealth services, the patient and I are both in [facility], and the patient and I are the only participants on the call. He got a new CPAP machine finally 3-4 mos ago, but the machine is not working. He was told by [facility] that he can get a new machine now. He continues to use the original 2017 machine. He uses it nightly with excellent relief of his symptoms, no snoring and no EDS. He complains of tinnitus. He has not had Covid and he never got vaccinated.\par

## 2022-11-03 ENCOUNTER — APPOINTMENT (OUTPATIENT)
Dept: ORTHOPEDIC SURGERY | Facility: CLINIC | Age: 77
End: 2022-11-03

## 2022-11-03 PROCEDURE — 20610 DRAIN/INJ JOINT/BURSA W/O US: CPT | Mod: RT

## 2022-11-03 RX ORDER — HYALURONATE SODIUM, STABILIZED 60 MG/3 ML
60 SYRINGE (ML) INTRAARTICULAR
Qty: 1 | Refills: 0 | Status: COMPLETED | COMMUNITY
Start: 2022-10-20 | End: 2022-11-03

## 2022-11-04 NOTE — PROCEDURE
[de-identified] : cc: right knee pain and stiffness\par DX: right knee DJD \par \par Under strict sterile technique, the right knee was prepped with Betadine. Using the superolateral approach, with the patient supine, a 4mL injection of Durolane was administered intra-articularly. The patient tolerated the procedure well. The patient was instructed to avoid vigorous exercise for 48 hours and will apply ice to the knee for 20 minutes 2-3 times per day if discomfort occurs. Patient will return on an as needed basis. The patient will call if any questions or problems should arise.\par \par Durolane injection - Right knee joint\par Lot #: [id number]\par Exp: 05-\par Man: [employer]\par ND: [id number]

## 2022-12-04 ENCOUNTER — NON-APPOINTMENT (OUTPATIENT)
Age: 77
End: 2022-12-04

## 2023-03-16 ENCOUNTER — APPOINTMENT (OUTPATIENT)
Dept: UROLOGY | Facility: CLINIC | Age: 78
End: 2023-03-16
Payer: MEDICARE

## 2023-03-16 VITALS
DIASTOLIC BLOOD PRESSURE: 88 MMHG | HEART RATE: 77 BPM | TEMPERATURE: 97.3 F | SYSTOLIC BLOOD PRESSURE: 155 MMHG | OXYGEN SATURATION: 98 %

## 2023-03-16 PROCEDURE — 99213 OFFICE O/P EST LOW 20 MIN: CPT

## 2023-03-16 NOTE — HISTORY OF PRESENT ILLNESS
[FreeTextEntry1] : CC: BPH, weak stream, elevated PSA \par \par HPI:\par 77 year old man last seen 12/21\par \par Nocturia 3x and stable\par \par Occasional weak stream and urgency \par Tamsulosin 0.4 mg \par Denies any other associated symptoms\par Took tadalafil daily 3 months but stopped, when asked if helped he stated "not really, no change at all".  \par He tells me the PSA "was 10" but does not have the results\par \par PVR 3 cc\par Recent US 1/2023 demonstrates 50 cc prostate and 38 cc PVR \par \par Has not taken his tadalafil for ED since last visit.\par PSA history\par Nov 2018 3.03\par March 2019 3.48\par August 2021 4.3 \par \par Patient had a negative TRUS biopsy in 2015 but complicated by severe sepsis with prolonged [facility] admission under Dr. Alex. No history prostate MRI.\par \par FAMHX: No history of  cancers.\par SURGHX: lobectomy, leg amputation\par SOCIAL: , 2 kids, quit smoking >50 years ago, 2-3 glasses wine/week\par ROS: negative\par \par \par  \par \par

## 2023-03-16 NOTE — ASSESSMENT
[FreeTextEntry1] : Diagnosis: \par BPH, elevated PSA, nocturia\par \par Plan: \par Continue tamsulosin\par If PSA has risen up to 10 range then MRI \par \par Javi Sellers MD, FACS, FRCS \par  of Urology [facility]\par Director of Laparoscopic and Robotic Surgery \par [facility] Director of Urology, [facility] \par Professor of Urology\par \par (Office) \par (Cell)  [phone] \par [email or web address]\par \par \par

## 2023-03-17 ENCOUNTER — NON-APPOINTMENT (OUTPATIENT)
Age: 78
End: 2023-03-17

## 2023-03-17 LAB — PSA SERPL-MCNC: 12.5 NG/ML

## 2023-04-01 ENCOUNTER — APPOINTMENT (OUTPATIENT)
Dept: MRI IMAGING | Facility: IMAGING CENTER | Age: 78
End: 2023-04-01
Payer: MEDICARE

## 2023-04-01 ENCOUNTER — OUTPATIENT (OUTPATIENT)
Dept: OUTPATIENT SERVICES | Facility: HOSPITAL | Age: 78
LOS: 1 days | End: 2023-04-01
Payer: MEDICARE

## 2023-04-01 ENCOUNTER — RESULT REVIEW (OUTPATIENT)
Age: 78
End: 2023-04-01

## 2023-04-01 DIAGNOSIS — R97.20 ELEVATED PROSTATE SPECIFIC ANTIGEN [PSA]: ICD-10-CM

## 2023-04-01 PROCEDURE — 76498P: CUSTOM | Mod: 26

## 2023-04-01 PROCEDURE — 76498 UNLISTED MR PROCEDURE: CPT

## 2023-04-01 PROCEDURE — A9585: CPT

## 2023-04-01 PROCEDURE — 72197 MRI PELVIS W/O & W/DYE: CPT | Mod: 26

## 2023-04-01 PROCEDURE — 72197 MRI PELVIS W/O & W/DYE: CPT

## 2023-04-07 ENCOUNTER — APPOINTMENT (OUTPATIENT)
Dept: UROLOGY | Facility: CLINIC | Age: 78
End: 2023-04-07

## 2023-04-07 VITALS — HEIGHT: 68 IN | BODY MASS INDEX: 29.1 KG/M2 | WEIGHT: 192 LBS

## 2023-04-10 PROBLEM — Z83.3 FAMILY HISTORY OF DIABETES MELLITUS: Status: ACTIVE | Noted: 2023-04-07

## 2023-04-10 RX ORDER — VITAMIN E ACID SUCCINATE 268 MG
TABLET ORAL
Refills: 0 | Status: ACTIVE | COMMUNITY

## 2023-04-10 RX ORDER — UBIDECARENONE/VIT E ACET 100MG-5
CAPSULE ORAL
Refills: 0 | Status: ACTIVE | COMMUNITY

## 2023-04-11 ENCOUNTER — APPOINTMENT (OUTPATIENT)
Dept: UROLOGY | Facility: CLINIC | Age: 78
End: 2023-04-11
Payer: MEDICARE

## 2023-04-11 ENCOUNTER — APPOINTMENT (OUTPATIENT)
Dept: UROLOGY | Facility: CLINIC | Age: 78
End: 2023-04-11

## 2023-04-11 DIAGNOSIS — Z83.3 FAMILY HISTORY OF DIABETES MELLITUS: ICD-10-CM

## 2023-04-11 DIAGNOSIS — R97.20 ELEVATED PROSTATE, SPECIFIC ANTIGEN [PSA]: ICD-10-CM

## 2023-04-11 DIAGNOSIS — N13.8 BENIGN PROSTATIC HYPERPLASIA WITH LOWER URINARY TRACT SYMPMS: ICD-10-CM

## 2023-04-11 DIAGNOSIS — N40.1 BENIGN PROSTATIC HYPERPLASIA WITH LOWER URINARY TRACT SYMPMS: ICD-10-CM

## 2023-04-11 PROCEDURE — 99214 OFFICE O/P EST MOD 30 MIN: CPT | Mod: 95

## 2023-04-11 NOTE — HISTORY OF PRESENT ILLNESS
[Home] : at home, [unfilled] , at the time of the visit. [Medical Office: ([facility])___] : at the medical office located in  [Spouse] : spouse [Verbal consent obtained from patient] : the patient, [unfilled] [FreeTextEntry1] : Dear Dr. Sellers, Javi\par \par  Thank you for so much for the referral to help care for your patient.\par \par Chief Complaint: Elevated PSA\par Date of first visit: 04/07/2023\par \par  QUINTEN KELLER  is a 77 year old  man with PMHx HTN, LLE sarcoma and lung masses s/p LLE amputation, right lung lobectomy (1973) and BPH, ED, severe ROBBIE on CPAP who presents for elevated PSA. His PSA has ranged from 3.03-4.3 ng/ml since 2018 with a recent jump to 10.2 ng/ml in Feb 2023 and confirmed 12.5 ng/ml on 3/16/23. MRI on 4/1/23 demonstrated two lesions (PIRADS 4 left mid pzpm-pl and PIRADS 3 left mid TZa-p). His PSA density is normal (0.11 ng/ml/cc). He has had one prior negative biopsy in 2015 complicated by sepsis and hospital admission. He denies family hx of prostate cancer. SOFY on 3/16/23 with Dr Sellers was normal.\par \par Currently taking flomax and tadalafil for BPH and is happy with regimen. Minimal urinary complaints.\par \par PSA Hx\par 12.50 ng/ml on 03/16/2023. PSAD 0.11 ng/ml/cc\par 10.2 in Feb 2023 per patient\par 3.48 ng/ml on 03/11/2019\par 3.03 ng/ml on 11/21/2018\par \par MRI Hx\par MRI at [facility] 04/01/2023. Volume 110  cc prostate with PIRADS 4 lesion to the left, posterior medial - lateral (PZpm-pl), [id number]-[id number], peripheral zone measuring 7 x 5 x 6 mm.  No LAD, EPE abutment, tumor abuts but does not deform capsule, No Bony Lesions. Lesion #2 to the left, anterior-posterior (TZa-p), midgland, transition zone with PIRADS 3 measuring 18 x 13 x 13 mm. No LAD, No EPE-clearly confined/intervening normal tissue between tumor and intact prostate capsule. No bony lesions. The clinical implications have been discussed with the patient.\par \par Biopsy Hx\par 2015- no path provided\par negative per pt and Dr Sellers's note\par \par 04/07/2023\par IPSS 4 QOL 1\par IIEF \par ED Function Score: 28\par Orgasmic Function Score: 6\par Sexual Desire Score: 8\par [place] Satisfaction: 12\par Overall Satisfaction: 10\par  \par Prostate cancer screening: the patient and I spoke at length about prostate cancer screening, its risks and its benefits. The patient has 2 (age, PSA) risk factors for prostate cancer.  He understands that many men with prostate cancer will die with the disease rather than of it and we also discussed the results large multi-center American and  prostate cancer screening trials. He also understands that PSA in and of itself does not diagnose prostate cancer but only assesses risk to a certain degree. The patient understands that to definitively screen for prostate cancer, a biopsy is required and this procedure has risks, including bleeding, infection, ED and urinary retention. The patient opted to proceed with fusion biopsy.  \par \par The patient denies fevers, chills, nausea and or vomiting and no unexplained weight loss.\par \par  All pertinent laboratory, films and physician notes were reviewed.  Questionnaire results were discussed with patient.

## 2023-04-11 NOTE — ASSESSMENT
[FreeTextEntry1] : 76 yo male with rapid increase in PSA now 12.5 ng/ml, MRI with PIRADS 4 and PIRADS 3 lesion, normal PSAD 0.11 ng/ml/cc, one prior negative biopsy 2015 c/b sepsis, neg FH CaP, neg SOFY 3/16/23. 110cc prostate on dual therapy and happy with regimen.\par \par 1. Book for biopsy with Dr Morris\par 2. 110cc prostate- continue dual therapy. PVR at Dr Morris visit\par 3. LLE AKA-- no issues with mobility or getting on exam table for biopsy\par 4. Consider periop abx after discussion with Dr Morris in addition to intraop abx given hx of sepsis. He is very nervous\par \par He understands that many men with prostate cancer will die with the disease rather than of it and we also discussed the results large multi-center American and  prostate cancer screening trials. He also understands that PSA in and of itself does not diagnose prostate cancer but only assesses risk to a certain degree. The patient understands that to definitively screen for prostate cancer, a biopsy is required and this procedure has risks, including bleeding, infection, ED and urinary retention. The patient opted to move forward with the biopsy.\par \par The patient is aware to expect hematuria x 2 weeks and upto 4 weeks of hematospermia.  There is a risk of infection albeit much lower than a transrectal approach. In some cases patients can experience erectile dysfunction but this is usually self limiting.  Any fever/chills after the biopsy the patient is to contact the office and go to the ER for an immediate evaluation. He has been given paper instructions outlining these items - which includes medications to avoid prior to surgery.\par \par 1. CBC, BMP, PSA, UA UCx. EKG. ROBBIE report (in allscripts). CXR within the past year\par 2. Medical Clearance\par 3. TP biopsy at [facility]\par 4. follow up 2 weeks after biopsy with his primary urologist or ourselves.\par 5. we will call with the path results once they are resulted.\par \par Follow up for MRI review with Dr Morris\par Biopsy with Dr Morris\par Post op visit with Dr Sellers\par \par \par \par \par Ana Laura Omer NP, was in the office during the entirety of this telemedicine visit.

## 2023-04-17 ENCOUNTER — APPOINTMENT (OUTPATIENT)
Dept: UROLOGY | Facility: CLINIC | Age: 78
End: 2023-04-17
Payer: MEDICARE

## 2023-04-17 VITALS
TEMPERATURE: 98.3 F | SYSTOLIC BLOOD PRESSURE: 173 MMHG | OXYGEN SATURATION: 94 % | DIASTOLIC BLOOD PRESSURE: 82 MMHG | HEART RATE: 65 BPM

## 2023-04-17 DIAGNOSIS — R35.0 FREQUENCY OF MICTURITION: ICD-10-CM

## 2023-04-17 PROCEDURE — 99215 OFFICE O/P EST HI 40 MIN: CPT

## 2023-04-19 LAB — BACTERIA UR CULT: NORMAL

## 2023-04-19 NOTE — HISTORY OF PRESENT ILLNESS
[FreeTextEntry1] : Language: English\par Date of First visit: /417/2023\par Accompanied by: ***\par Contact info: ***\par Referring Provider/PCP: Ben Flores\par [address]\par Phone [phone]\par fax: [phone]\par fax: [phone]\par \par \par CC/ Problem List:\par \par ===============================================================================\par FIRST VISIT:\par The patient is a 77 year male with a h/o HTN, LLE sarcoma, lung masses s/p LLE amputation, right lung lobectomy 1973, and BPH, ED, severe ROBBIE on CPAP who first presented to Dr. Sellers in 2021 for LUTS and now has an elevated PSA\par \par His PSA has ranged from 3.03-4.3 ng/ml since 2018 with a recent jump to 10.2 ng/ml in Feb 2023 and confirmed 12.5 ng/ml on 3/16/23. MRI on 4/1/23 demonstrated two lesions (PIRADS 4 left mid pzpm-pl and PIRADS 3 left mid TZa-p). His PSA density is normal (0.11 ng/ml/cc). He has had one prior negative biopsy in 2015 complicated by sepsis and hospital admission. He denies family hx of prostate cancer. SOFY on 3/16/23 with Dr Sellers was normal.\par \par Currently taking flomax and tadalafil for BPH and is happy with regimen. Minimal urinary complaints.\par \par \par 04/07/2023\par IPSS 4 QOL 1\par IIEF \par ED Function Score: 28\par Orgasmic Function Score: 6\par Sexual Desire Score: 8\par [place] Satisfaction: 12\par Overall Satisfaction: 10\par \par -------------------------------------------------------------------------------------------\par INTERVAL VISITS:\par \par The patient's age today 04/17/2023 is 77 year old.\par Please note interval events and changes in PMH, PSH, MEDS and ALLERGIES were reviewed.\par He is here to book a biopsy. He got E. coli sepsis the last time he had a biopsy \par \par ===============================================================================\par \par PMH: ED, ROBBIE, HLD, HTN\par PSH: Leg amp, Lung lobectomy, gout, prostate biopsy\par POBH: (if applicable)\par FH: \par \par ALL: NKDA\par MEDS: Candesartan, Atorvastatin, Labetalol, HCTZ, Allopurinol, , Tamsulosin, Tadalafil, Vit D, Vit B12, Vit E, Zinc, MVI\par SOC: quit smoking in his 20's, Social EtOH, no drugs\par \par \par ROS: Review of Systems is as per HPI unless otherwise denoted below\par \par \par ===============================================================================\par DATA: \par \par LABS:-------------------------------------------------------------------------------------------------------------------\par PSA Hx\par 3.03 ng/ml on 11/21/2018\par 3.48 ng/ml on 03/11/2019\par 10.2 in Feb 2023 per patient\par 12.50 ng/ml on 03/16/2023. PSAD 0.11 ng/ml/cc\par \par \par RADS:-------------------------------------------------------------------------------------------------------------------\par MRI Hx\par MRI at [facility] 04/01/2023. Volume 110 cc prostate with PIRADS 4 lesion to the left, posterior medial - lateral (PZpm-pl), [id number]-ri-jayy, peripheral zone measuring 7 x 5 x 6 mm. No LAD, EPE abutment, tumor abuts but does not deform capsule, No Bony Lesions. Lesion #2 to the left, anterior-posterior (TZa-p), midgland, transition zone with PIRADS 3 measuring 18 x 13 x 13 mm. No LAD, No EPE-clearly confined/intervening normal tissue between tumor and intact prostate capsule. No bony lesions. The clinical implications have been discussed with the patient.\par \par \par PATHOLOGY/CYTOLOGY:-------------------------------------------------------------------------------------------\par Biopsy Hx\par 2015- no path provided\par negative per pt and Dr Sellers's note\par \par \par \par VOIDING STUDIES: ----------------------------------------------------------------------------------------------------\par \par \par \par STONE STUDIES: (Analysis/LLSA)----------------------------------------------------------------------------------\par \par \par \par PROCEDURES: -----------------------------------------------------------------------------------------------\par \par \par \par \par ===============================================================================\par \par PHYSICAL EXAM:\par \par GEN: AAOx3, NAD; Habitus: BMI 30\par \par BARRIERS to CARE: none\par \par PSYCH: Appropriate Behavior, Affect Congruent\par \par HEENT: AT/NC Trachea midline. EOMI.\par \par Lungs: No labored breathing.\par \par NEURO: + Movement, all 4 extremities grossly intact without deficits. No tremors.\par \par SKIN: Warm dry. No visible rashes or ulcers\par \par GAIT: Gait antalgic, Stability fair (he has a full leg prosthesis)\par \par \par Prostate: (date 4/17/2023) 60+ grams. Smooth. No nodules. Symmetric. No tenderness, No Fluctuance.\par SOFY: No hemorrhoids. Normal tone.\par =======================================================================================\par \par \par \par \par ASSESSMENT and PLAN\par \par The patient is a 77 year male with a history of the following:\par \par 1. Elevated PSA, normal PSAD, enlarged prostate, PIRADS 3 and 4 lesions in prostate on MRI\par \par Because of the patient's clinical situation we decided to set the patient up for a Transperineal Fusion prostate biopsy. This is done in the operating room under anesthesia.\par \par The patient understands that the risks of this procedure include bleeding (hematuria, hematospermia, blood in stool or per rectum), infection, difficulty voiding/inability to urinate, fever, and, with repeated biopsies, problems with erections.  He understands that if he can not void after the biopsy he will go home with a bermudez. He is at higher risk for this because of his prostate size and pre-existing LUTS\par \par He was given and understands the biopsy prep:\par \par a. Fleet's enema on the morning of your biopsy.\par \par b. He is not on blood thinners.\par \par c. He was given information regarding blood thinners if he is on them. \par \par -----------------------------------------------------------------------------------------------------\par LABS/TESTS Ordered: Urine culture\par Meds Ordered:\par Follow up: Surgery; we will do surgery without his prosthesis\par -----------------------------------------------------------------------------------------------------\par \par The total amount of time I have personally spent preparing for this visit, reviewing the patient's test results, obtaining external history, ordering tests/medications, documenting clinical information, communicating with and counseling the patient/family and/or caregiver(s), and spent face to face with the patient explaining the above was  45 minutes.\par \par \par Thank you for allowing me to assist in the care of your patient. Should you have any questions please do not hesitate to reach out to me.\par \par \par Galina Morris MD\[facility] Associate \[facility] Department of Urology\[facility] Phone: [phone]\[facility] Fax: [phone]\[facility] \[place] [place]\[facility] [address]\[facility]

## 2023-05-16 ENCOUNTER — OUTPATIENT (OUTPATIENT)
Dept: OUTPATIENT SERVICES | Facility: HOSPITAL | Age: 78
LOS: 1 days | End: 2023-05-16
Payer: MEDICARE

## 2023-05-16 DIAGNOSIS — R97.20 ELEVATED PROSTATE SPECIFIC ANTIGEN [PSA]: ICD-10-CM

## 2023-05-16 PROCEDURE — C8001: CPT

## 2023-05-16 RX ORDER — CANDESARTAN CILEXETIL 8 MG/1
1 TABLET ORAL
Refills: 0 | DISCHARGE

## 2023-05-16 RX ORDER — TADALAFIL 10 MG/1
1 TABLET, FILM COATED ORAL
Refills: 0 | DISCHARGE

## 2023-05-16 RX ORDER — TAMSULOSIN HYDROCHLORIDE 0.4 MG/1
1 CAPSULE ORAL
Refills: 0 | DISCHARGE

## 2023-05-16 RX ORDER — ASPIRIN/CALCIUM CARB/MAGNESIUM 324 MG
1 TABLET ORAL
Refills: 0 | DISCHARGE

## 2023-05-16 RX ORDER — ALLOPURINOL 300 MG
1 TABLET ORAL
Refills: 0 | DISCHARGE

## 2023-05-16 RX ORDER — HYDROCHLOROTHIAZIDE 25 MG
1 TABLET ORAL
Refills: 0 | DISCHARGE

## 2023-05-16 RX ORDER — ATORVASTATIN CALCIUM 80 MG/1
1 TABLET, FILM COATED ORAL
Refills: 0 | DISCHARGE

## 2023-05-16 RX ORDER — LABETALOL HCL 100 MG
1 TABLET ORAL
Refills: 0 | DISCHARGE

## 2023-05-16 NOTE — ASU PATIENT PROFILE, ADULT - NSICDXPASTSURGICALHX_GEN_ALL_CORE_FT
PAST SURGICAL HISTORY:  History of amputation of lower extremity Left-non traumatic    History of lung surgery     History of surgery to major organs, presenting hazards to health History of prostate biopsy    Other postprocedural status Right

## 2023-05-16 NOTE — ASU PATIENT PROFILE, ADULT - NS PREOP UNDERSTANDS INFO
No solid food/dairy/candy/gum after 10:30pm tonight, water allowed before 05:30 tomorrow; patient to come with photo ID/insurance card, dress in comfortable clothes; no jewelries/valuables/contact lens; no smoking/alcohol/recreational drug use. Escort to come with photo ID. Address and callback number was given./yes

## 2023-05-17 ENCOUNTER — OUTPATIENT (OUTPATIENT)
Dept: OUTPATIENT SERVICES | Facility: HOSPITAL | Age: 78
LOS: 1 days | Discharge: HOME | End: 2023-05-17
Payer: MEDICARE

## 2023-05-17 ENCOUNTER — APPOINTMENT (OUTPATIENT)
Dept: UROLOGY | Facility: AMBULATORY SURGERY CENTER | Age: 78
End: 2023-05-17

## 2023-05-17 ENCOUNTER — TRANSCRIPTION ENCOUNTER (OUTPATIENT)
Age: 78
End: 2023-05-17

## 2023-05-17 ENCOUNTER — RESULT REVIEW (OUTPATIENT)
Age: 78
End: 2023-05-17

## 2023-05-17 VITALS
RESPIRATION RATE: 16 BRPM | DIASTOLIC BLOOD PRESSURE: 70 MMHG | WEIGHT: 192.24 LBS | OXYGEN SATURATION: 96 % | SYSTOLIC BLOOD PRESSURE: 144 MMHG | HEART RATE: 58 BPM | HEIGHT: 66 IN | TEMPERATURE: 98 F

## 2023-05-17 VITALS
SYSTOLIC BLOOD PRESSURE: 154 MMHG | RESPIRATION RATE: 16 BRPM | TEMPERATURE: 97 F | HEART RATE: 53 BPM | DIASTOLIC BLOOD PRESSURE: 73 MMHG

## 2023-05-17 DIAGNOSIS — Z98.890 OTHER SPECIFIED POSTPROCEDURAL STATES: Chronic | ICD-10-CM

## 2023-05-17 PROCEDURE — 55706 BX PRST8 NDL SAT SAMPLING: CPT

## 2023-05-17 PROCEDURE — 76377 3D RENDER W/INTRP POSTPROCES: CPT | Mod: 26

## 2023-05-17 PROCEDURE — 76872 US TRANSRECTAL: CPT | Mod: 26

## 2023-05-17 PROCEDURE — G0416: CPT | Mod: 26

## 2023-05-17 RX ORDER — ACETAMINOPHEN 500 MG
1000 TABLET ORAL ONCE
Refills: 0 | Status: DISCONTINUED | OUTPATIENT
Start: 2023-05-17 | End: 2023-05-17

## 2023-05-17 RX ORDER — SODIUM CHLORIDE 9 MG/ML
1000 INJECTION, SOLUTION INTRAVENOUS
Refills: 0 | Status: DISCONTINUED | OUTPATIENT
Start: 2023-05-17 | End: 2023-05-17

## 2023-05-17 NOTE — ASU DISCHARGE PLAN (ADULT/PEDIATRIC) - ASU DC SPECIAL INSTRUCTIONSFT
PROSTATE BIOPSY    GENERAL: Expect blood in the urine, stool, and ejaculate for up to two (2) months postoperatively. This is normal and to be expected after this procedure. Increase hydration to keep the urine as clear as possible.    SURGICAL WOUND: There are often lumps and bumps that can be felt in the perineum (skin between scrotum and anus) for up to two (2) months or more post operatively. These are of no concern and with time they will soften and disappear.  Any “black and blue” bruising areas will also resolve.  You may apply an ice-pack for 15 minutes out of every hour for the first 24 -36 hours to minimize pain and swelling. You may apply over the counter Bacitracin to the wound several times a day, and keep covered with over the counter gauze as necessary.    CATHETER: Some patients are sent home with a Simon catheter, while others go home urinating on their own. A Simon catheter continuously drains the urine from the bladder. If you still have a catheter, the nurses will review instructions and care before you go home. For men, you may have a prescription for lidocaine jelly to apply to the tip of your penis, as needed, for catheter related discomfort.     PAIN: You may have some intermittent pain for up to six (6) weeks post operatively. Pain does not signify any problem unless associated with fever, chills, or inability to void.  If you experience any fevers or chills please call immediately as this may be signs of an infection. You may take Tylenol (acetaminophen) 650-975mg and/or Motrin (ibuprofen) 400-600mg, both available over the counter, for pain every 6 hours as needed. Do not exceed 4000mg of Tylenol (acetaminophen) daily. You may alternate these medications such that you take one or the other every 3 hours for around the clock pain coverage.    ANTICOAGULATION: If you are taking any blood thinning medications, please discuss with your urologist prior to restarting these medications unless otherwise specified.    BATHING: You may shower with soap and water, and pat dry the needle insertion sites in the perineum. Avoid sitting in water for prolonged periods of time for the next few days.    DIET: You may resume your regular diet and regular medication regimen.    ACTIVITY: No heavy lifting or strenuous exercise until you are evaluated at your post-operative appointment. Otherwise, you may return to your usual level of physical activity.    FOLLOW-UP: If you did not already schedule your post-operative appointment, please call your urologist to schedule and follow-up appointment.    CALL YOUR UROLOGIST IF: You have any bleeding that does not stop, inability to void >8 hours, fever over 100.4 F, chills, persistent nausea/vomiting, changes in your incision concerning for infection, or if your pain is not controlled on your discharge pain medications.

## 2023-05-17 NOTE — BRIEF OPERATIVE NOTE - TYPE OF ANESTHESIA
Impression: Peripheral retinal degeneration: H35.40. Plan: Pt edu. Stable retina and and segment. No keratoconus despite slightly steep Ks. Monitor with DFE and lennox in 1 year. RTC sooner if changes.
MAC

## 2023-05-17 NOTE — ASU DISCHARGE PLAN (ADULT/PEDIATRIC) - NS MD DC FALL RISK RISK
For information on Fall & Injury Prevention, visit: [email or web address] OR  [email or web address] OR  https://www.cdc.gov/steadi/patient.html

## 2023-05-17 NOTE — ASU DISCHARGE PLAN (ADULT/PEDIATRIC) - CARE PROVIDER_API CALL
Galina Morris)  Urology  [facility]. [address] [place] [address]  Phone: [phone]  Fax: [phone]  Follow Up Time:

## 2023-05-17 NOTE — BRIEF OPERATIVE NOTE - OPERATION/FINDINGS
46.3x62.1x65.1 = 97.8gm with multiple hypoechoic areas; the TZa target corresponded to a hypoechoic area

## 2023-05-18 ENCOUNTER — NON-APPOINTMENT (OUTPATIENT)
Age: 78
End: 2023-05-18

## 2023-05-24 LAB — SURGICAL PATHOLOGY STUDY: SIGNIFICANT CHANGE UP

## 2023-05-30 ENCOUNTER — APPOINTMENT (OUTPATIENT)
Dept: UROLOGY | Facility: CLINIC | Age: 78
End: 2023-05-30
Payer: MEDICARE

## 2023-05-30 VITALS
SYSTOLIC BLOOD PRESSURE: 157 MMHG | OXYGEN SATURATION: 97 % | DIASTOLIC BLOOD PRESSURE: 82 MMHG | HEART RATE: 67 BPM | TEMPERATURE: 98.4 F

## 2023-05-30 PROBLEM — R78.81 BACTEREMIA: Chronic | Status: ACTIVE | Noted: 2023-05-16

## 2023-05-30 PROCEDURE — 99213 OFFICE O/P EST LOW 20 MIN: CPT

## 2023-05-30 NOTE — HISTORY OF PRESENT ILLNESS
[FreeTextEntry1] : Dr. Ben Flores\par [address] [facility], \[place] [address]\par \par CC: Elevated PSA\par \par Nilesh Barnes is doing well post prostate biopsy on 5/17/23\par Pathology was benign.\par \par Currently on tamsulosin 0.4 mg and doing well with mediation \par \par MRI 4/1/23 with 110 mL prostate\par PIRADs 4 lesion left peripheral zone and PIRADs 3 lesion left transition zone.\par \par PSA history\par Nov 2018 3.03\par March 2019 3.48\par August 2021 4.3 \par March 2023-12.50 n/mL\par \par \par FAMHX: No history of  cancers.\par SURGHX: lobectomy, leg amputation\par SOCIAL: , 2 kids, quit smoking >50 years ago, 2-3 glasses wine/week\par

## 2023-05-30 NOTE — ASSESSMENT
[FreeTextEntry1] : Diagnosis: Elevated PSA\par \par Plan:\par pathology from biopsy reviewed\par Continue with tamsulosin 0.4 mg daily.  Patient discussed discontinuing medication, given size of prostate gland would recommend he stay on mication as he is having little to no side effects from medication.\par follow up in 6 months to asses urinary symptoms.\par \par RTC in 6 months \par

## 2023-06-06 ENCOUNTER — APPOINTMENT (OUTPATIENT)
Dept: ORTHOPEDIC SURGERY | Facility: CLINIC | Age: 78
End: 2023-06-06
Payer: MEDICARE

## 2023-06-06 PROCEDURE — 20610 DRAIN/INJ JOINT/BURSA W/O US: CPT

## 2023-06-06 NOTE — PROCEDURE
[de-identified] : cc; right knee pain and stiffness\par DX: right knee DJD \par \par Under strict sterile technique, the right knee was prepped with Chloraprep. Using the superolateral approach, with the patient supine, a 3mL injection of Durolane was administered intra-articularly. The patient tolerated the procedure well. The patient was instructed to avoid vigorous exercise for 48 hours and will apply ice to the knee for 20 minutes 2-3 times per day if discomfort occurs. Patient will return on an as needed basis. The patient will call if any questions or problems should arise. \par \par Lot [id number]\par 2025-09-30\par \par

## 2023-07-24 ENCOUNTER — OUTPATIENT (OUTPATIENT)
Dept: OUTPATIENT SERVICES | Facility: HOSPITAL | Age: 78
LOS: 1 days | End: 2023-07-24
Payer: MEDICARE

## 2023-07-24 ENCOUNTER — APPOINTMENT (OUTPATIENT)
Dept: ORTHOPEDIC SURGERY | Facility: CLINIC | Age: 78
End: 2023-07-24
Payer: MEDICARE

## 2023-07-24 ENCOUNTER — RESULT REVIEW (OUTPATIENT)
Age: 78
End: 2023-07-24

## 2023-07-24 DIAGNOSIS — Z98.890 OTHER SPECIFIED POSTPROCEDURAL STATES: Chronic | ICD-10-CM

## 2023-07-24 PROCEDURE — 73564 X-RAY EXAM KNEE 4 OR MORE: CPT

## 2023-07-24 PROCEDURE — 73564 X-RAY EXAM KNEE 4 OR MORE: CPT | Mod: 26,RT

## 2023-07-24 PROCEDURE — 20610 DRAIN/INJ JOINT/BURSA W/O US: CPT | Mod: RT

## 2023-07-24 PROCEDURE — 99213 OFFICE O/P EST LOW 20 MIN: CPT | Mod: 25

## 2023-07-24 RX ORDER — HYALURONATE SODIUM, STABILIZED 60 MG/3 ML
60 SYRINGE (ML) INTRAARTICULAR
Qty: 1 | Refills: 0 | Status: COMPLETED | COMMUNITY
Start: 2023-04-14 | End: 2023-06-06

## 2023-07-30 NOTE — PHYSICAL EXAM
[de-identified] : The patient is a well developed, well nourished male in no apparent distress. He is alert and oriented X 3 with a pleasant mood and appropriate affect. \par  \par  On physical examination of the right knee, his ROM is 0 to 125 degrees The patient walks with a limp.There is scant  effusion. No warmth or erythema is noted. The patella is non tender to palpation medially or laterally. There is no crepitus noted. The apprehension and grind tests are negative. The extensor mechanism is intact. There is mild medial joint line tenderness. The Mason sign is positive. The Lachman and pivot shift tests are negative. There is no varus or valgus laxity at 0 or 30 degrees. No posterolateral or anteromedial laxity is noted. No masses are palpable. No other soft tissue or bony tenderness is noted. Quadriceps weakness is noted. Neurovascular function is intact.  [de-identified] : Radiographs of the right knee show moderate medial compartment DJD--stable from last radiographs in 2018

## 2023-07-30 NOTE — HISTORY OF PRESENT ILLNESS
[de-identified] : Martin returns today for evaluation of his right knee. He reports continued medial knee pain and stiffness despite the HA injection six weeks ago. He has had difficulty walking and pain with stairs. He has had some intermittent swelling. He has been taking Advil with some relief.  He denies any locking or buckling.

## 2023-07-30 NOTE — PROCEDURE
[de-identified] : Under strict sterile technique, the right knee was prepped with Betadine. Using the superolateral approach, with the patient supine, 1ml of Kenalog was mixed with 5mls of 1% Lidocaine and 5mLs of 0.5% Marcaine, and was injected intraarticularly. The patient tolerated the procedure well. The patient was instructed to avoid vigorous exercise for 24 hours and will apply ice to the knee for 20 minutes 2-3 times per day if discomfort occurs. Patient will return on an as needed basis. The patient will call if any questions or problems should arise

## 2023-07-30 NOTE — DISCUSSION/SUMMARY
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 He received a cortisone injection today. He will increase his activities as tolerated. we will see him back on an as needed basis. He will call if any issues arise

## 2023-09-26 RX ORDER — TADALAFIL 20 MG/1
20 TABLET ORAL
Qty: 30 | Refills: 2 | Status: ACTIVE | COMMUNITY
Start: 2023-09-26 | End: 1900-01-01

## 2023-10-12 ENCOUNTER — APPOINTMENT (OUTPATIENT)
Dept: UROLOGY | Facility: CLINIC | Age: 78
End: 2023-10-12
Payer: MEDICARE

## 2023-10-12 VITALS
HEART RATE: 64 BPM | WEIGHT: 192 LBS | BODY MASS INDEX: 29.1 KG/M2 | DIASTOLIC BLOOD PRESSURE: 83 MMHG | OXYGEN SATURATION: 96 % | TEMPERATURE: 97.3 F | HEIGHT: 68 IN | SYSTOLIC BLOOD PRESSURE: 166 MMHG

## 2023-10-12 PROCEDURE — 99213 OFFICE O/P EST LOW 20 MIN: CPT

## 2023-10-12 RX ORDER — TADALAFIL 5 MG/1
5 TABLET ORAL
Qty: 90 | Refills: 3 | Status: ACTIVE | COMMUNITY
Start: 2023-10-12 | End: 1900-01-01

## 2023-10-13 LAB — PSA SERPL-MCNC: 13.1 NG/ML

## 2023-10-27 NOTE — ASU PATIENT PROFILE, ADULT - AS SC BRADEN FRICTION
Pt states having SOB recently, states was at a doctor's appointment yesterday and was told to go see a cardiologist.  Pt states didn't move around much today but the other day almost \"passed out\"  
(3) no apparent problem

## 2023-11-01 ENCOUNTER — APPOINTMENT (OUTPATIENT)
Dept: PULMONOLOGY | Facility: CLINIC | Age: 78
End: 2023-11-01
Payer: MEDICARE

## 2023-11-01 DIAGNOSIS — G47.33 OBSTRUCTIVE SLEEP APNEA (ADULT) (PEDIATRIC): ICD-10-CM

## 2023-11-01 PROCEDURE — 99441: CPT

## 2023-11-06 ENCOUNTER — APPOINTMENT (OUTPATIENT)
Dept: SURGERY | Facility: CLINIC | Age: 78
End: 2023-11-06
Payer: MEDICARE

## 2023-11-06 VITALS
BODY MASS INDEX: 31.39 KG/M2 | OXYGEN SATURATION: 97 % | DIASTOLIC BLOOD PRESSURE: 93 MMHG | TEMPERATURE: 98.7 F | RESPIRATION RATE: 15 BRPM | HEIGHT: 67 IN | SYSTOLIC BLOOD PRESSURE: 177 MMHG | WEIGHT: 200 LBS

## 2023-11-06 DIAGNOSIS — R10.32 LEFT LOWER QUADRANT PAIN: ICD-10-CM

## 2023-11-06 PROCEDURE — 99203 OFFICE O/P NEW LOW 30 MIN: CPT

## 2023-11-06 RX ORDER — HYDROCHLOROTHIAZIDE 12.5 MG/1
12.5 TABLET ORAL
Qty: 90 | Refills: 0 | Status: DISCONTINUED | COMMUNITY
Start: 2017-06-07 | End: 2023-11-06

## 2023-11-27 RX ORDER — HYALURONATE SODIUM, STABILIZED 60 MG/3 ML
60 SYRINGE (ML) INTRAARTICULAR
Qty: 1 | Refills: 0 | Status: ACTIVE | COMMUNITY
Start: 2023-11-27

## 2023-12-05 ENCOUNTER — APPOINTMENT (OUTPATIENT)
Dept: UROLOGY | Facility: CLINIC | Age: 78
End: 2023-12-05

## 2023-12-07 ENCOUNTER — APPOINTMENT (OUTPATIENT)
Dept: ORTHOPEDIC SURGERY | Facility: CLINIC | Age: 78
End: 2023-12-07
Payer: MEDICARE

## 2023-12-07 VITALS
TEMPERATURE: 97.7 F | HEART RATE: 72 BPM | HEIGHT: 67 IN | OXYGEN SATURATION: 97 % | SYSTOLIC BLOOD PRESSURE: 117 MMHG | WEIGHT: 200 LBS | BODY MASS INDEX: 31.39 KG/M2 | DIASTOLIC BLOOD PRESSURE: 69 MMHG

## 2023-12-07 PROCEDURE — 20610 DRAIN/INJ JOINT/BURSA W/O US: CPT | Mod: RT

## 2024-02-26 RX ORDER — TAMSULOSIN HYDROCHLORIDE 0.4 MG/1
0.4 CAPSULE ORAL
Qty: 30 | Refills: 3 | Status: ACTIVE | COMMUNITY
Start: 2023-10-12 | End: 1900-01-01

## 2024-04-11 ENCOUNTER — APPOINTMENT (OUTPATIENT)
Dept: UROLOGY | Facility: CLINIC | Age: 79
End: 2024-04-11
Payer: MEDICARE

## 2024-04-11 PROCEDURE — 99213 OFFICE O/P EST LOW 20 MIN: CPT

## 2024-04-11 NOTE — ASSESSMENT
[FreeTextEntry1] : DX: ED< BPH  Plan  Continue alpha blocker Trial of 20 mg Cialis for ED Follow up with PSA in 6 months  Javi Sellers MD, FACS, FRCS  of Urology [facility] Director of Laparoscopic and Robotic Surgery [facility] Director of Urology, [facility] Professor of Urology   (Office) [phone] (Cell)  [phone] [email or web address]  The total amount of time I have personally spent preparing for this visit, reviewing the patient's test results, obtaining external history, ordering tests/medications, documenting clinical information, communicating with and counseling the patient/family and/or caregiver(s), and spent face to face with the patient explaining the above was minutes =20

## 2024-04-11 NOTE — HISTORY OF PRESENT ILLNESS
[FreeTextEntry1] : Ben Flores [address] [place] [address]  CC: ED, LUTS  Voiding normally, stable, mild LUTS MRI 4/1/23 with 110 mL prostate PIRADs 4 lesion left peripheral zone and PIRADs 3 lesion left transition zone. Benign prostate biopsy 5/2023 PSA personally reviewed and independently interpreted; stable at 13 Minimal benefit of Cialis daily for erections  FAMHX: No history of  cancers. SURGHX: lobectomy, leg amputation SOCIAL: , 2 kids, quit smoking >50 years ago, 2-3 glasses wine/week

## 2024-05-14 RX ORDER — HYALURONATE SODIUM, STABILIZED 60 MG/3 ML
60 SYRINGE (ML) INTRAARTICULAR
Qty: 1 | Refills: 0 | Status: ACTIVE | COMMUNITY
Start: 2024-05-14

## 2024-06-18 ENCOUNTER — APPOINTMENT (OUTPATIENT)
Dept: ORTHOPEDIC SURGERY | Facility: CLINIC | Age: 79
End: 2024-06-18

## 2024-06-18 DIAGNOSIS — M17.11 UNILATERAL PRIMARY OSTEOARTHRITIS, RIGHT KNEE: ICD-10-CM

## 2024-06-18 PROCEDURE — 20610 DRAIN/INJ JOINT/BURSA W/O US: CPT | Mod: RT

## 2024-06-21 PROBLEM — M17.11 PRIMARY OSTEOARTHRITIS OF RIGHT KNEE: Status: ACTIVE | Noted: 2017-10-23

## 2024-06-21 NOTE — PROCEDURE
[de-identified] : Martin returns today for a Durolane injection in his right knee. He has longstanding DJD in his right knee. He has had six months of effective symptomatic relief from his last injection in December. He reports a recent increase in medial knee pain and stiffness. He has been icing and decreased his walking due to pain. He denies any locking or buckling. He has been taking Tylenol as needed for pain.   CC: right knee pain and stiffness DX: right knee DJD    Under strict sterile technique, the right knee was prepped with Chloraprep. Using the superolateral approach, with the patient supine, a 4mL injection of Durolane  was administered intra-articularly. The patient tolerated the procedure well. The patient was instructed to avoid vigorous exercise for 48 hours and will apply ice to the knee for 20 minutes 2-3 times per day if discomfort occurs. Patient will return on an as needed basis. The patient will call if any questions or problems should arise  durolane right knee lot [id number] 2026-11-30

## 2024-06-24 NOTE — ASSESSMENT
[FreeTextEntry1] : Impression:\par ROBBIE on CPAP\par \par Plan:\par He is probably eligible for a new machine in Jan 2022 (5 years).\par I am not clear that he ever had a sleep study. Will get him a HST off CPAP for one night.\par Otherwise I think risk/benefit favors continuing to use his machine.\par They have already registered on the [employer] website for repair/replacement.\par Urged him to receive Covid vaccine. His risk/benefit analysis for the vaccine is totally off base. He visits his unvaccinated 95yo mother! Answered his questions to the best of my ability and he confirms that every other physician he knows has given him the same advice.\par --\par HST 8/2021: severe ROBBIE w desats,  / spoke to him, rx new PAP, will check existing settings w [facility]
stop drinking alcohol/[employer]

## 2024-10-10 ENCOUNTER — APPOINTMENT (OUTPATIENT)
Dept: UROLOGY | Facility: CLINIC | Age: 79
End: 2024-10-10
Payer: MEDICARE

## 2024-10-10 PROCEDURE — 99213 OFFICE O/P EST LOW 20 MIN: CPT

## 2024-11-05 ENCOUNTER — APPOINTMENT (OUTPATIENT)
Dept: ORTHOPEDIC SURGERY | Facility: CLINIC | Age: 79
End: 2024-11-05
Payer: MEDICARE

## 2024-11-05 ENCOUNTER — NON-APPOINTMENT (OUTPATIENT)
Age: 79
End: 2024-11-05

## 2024-11-05 VITALS
SYSTOLIC BLOOD PRESSURE: 176 MMHG | TEMPERATURE: 98.1 F | HEIGHT: 67 IN | HEART RATE: 62 BPM | DIASTOLIC BLOOD PRESSURE: 84 MMHG | WEIGHT: 200 LBS | BODY MASS INDEX: 31.39 KG/M2 | OXYGEN SATURATION: 96 %

## 2024-11-05 DIAGNOSIS — M17.11 UNILATERAL PRIMARY OSTEOARTHRITIS, RIGHT KNEE: ICD-10-CM

## 2024-11-05 PROCEDURE — 20610 DRAIN/INJ JOINT/BURSA W/O US: CPT | Mod: RT

## 2024-11-05 PROCEDURE — 99213 OFFICE O/P EST LOW 20 MIN: CPT | Mod: 25

## 2024-11-19 ENCOUNTER — APPOINTMENT (OUTPATIENT)
Dept: UROLOGY | Facility: CLINIC | Age: 79
End: 2024-11-19
Payer: MEDICARE

## 2024-11-19 VITALS
SYSTOLIC BLOOD PRESSURE: 175 MMHG | OXYGEN SATURATION: 94 % | HEART RATE: 62 BPM | DIASTOLIC BLOOD PRESSURE: 80 MMHG | TEMPERATURE: 98.2 F

## 2024-11-19 DIAGNOSIS — N40.0 BENIGN PROSTATIC HYPERPLASIA WITHOUT LOWER URINARY TRACT SYMPMS: ICD-10-CM

## 2024-11-19 DIAGNOSIS — N52.9 MALE ERECTILE DYSFUNCTION, UNSPECIFIED: ICD-10-CM

## 2024-11-19 PROCEDURE — 99213 OFFICE O/P EST LOW 20 MIN: CPT

## 2025-01-02 DIAGNOSIS — M17.11 UNILATERAL PRIMARY OSTEOARTHRITIS, RIGHT KNEE: ICD-10-CM

## 2025-01-02 RX ORDER — HYALURONATE SODIUM, STABILIZED 60 MG/3 ML
60 SYRINGE (ML) INTRAARTICULAR
Qty: 1 | Refills: 0 | Status: ACTIVE | COMMUNITY
Start: 2025-01-02

## 2025-01-21 ENCOUNTER — APPOINTMENT (OUTPATIENT)
Dept: ORTHOPEDIC SURGERY | Facility: CLINIC | Age: 80
End: 2025-01-21

## 2025-03-03 ENCOUNTER — APPOINTMENT (OUTPATIENT)
Dept: ORTHOPEDIC SURGERY | Facility: CLINIC | Age: 80
End: 2025-03-03

## 2025-03-03 DIAGNOSIS — M17.11 UNILATERAL PRIMARY OSTEOARTHRITIS, RIGHT KNEE: ICD-10-CM

## 2025-03-03 PROCEDURE — 20610 DRAIN/INJ JOINT/BURSA W/O US: CPT | Mod: RT

## 2025-04-29 ENCOUNTER — APPOINTMENT (OUTPATIENT)
Dept: UROLOGY | Facility: CLINIC | Age: 80
End: 2025-04-29
Payer: MEDICARE

## 2025-04-29 VITALS
TEMPERATURE: 97.7 F | BODY MASS INDEX: 31.39 KG/M2 | HEIGHT: 67 IN | OXYGEN SATURATION: 94 % | WEIGHT: 200 LBS | SYSTOLIC BLOOD PRESSURE: 157 MMHG | DIASTOLIC BLOOD PRESSURE: 86 MMHG | HEART RATE: 63 BPM

## 2025-04-29 PROCEDURE — 99213 OFFICE O/P EST LOW 20 MIN: CPT

## 2025-05-02 LAB — PSA SERPL-MCNC: 13.6 NG/ML

## 2025-05-14 ENCOUNTER — NON-APPOINTMENT (OUTPATIENT)
Age: 80
End: 2025-05-14

## 2025-05-16 ENCOUNTER — OUTPATIENT (OUTPATIENT)
Dept: OUTPATIENT SERVICES | Facility: HOSPITAL | Age: 80
LOS: 1 days | End: 2025-05-16
Payer: MEDICARE

## 2025-05-16 ENCOUNTER — RESULT REVIEW (OUTPATIENT)
Age: 80
End: 2025-05-16

## 2025-05-16 ENCOUNTER — APPOINTMENT (OUTPATIENT)
Dept: MRI IMAGING | Facility: IMAGING CENTER | Age: 80
End: 2025-05-16

## 2025-05-16 DIAGNOSIS — R97.20 ELEVATED PROSTATE SPECIFIC ANTIGEN [PSA]: ICD-10-CM

## 2025-05-16 DIAGNOSIS — Z98.890 OTHER SPECIFIED POSTPROCEDURAL STATES: Chronic | ICD-10-CM

## 2025-05-16 PROCEDURE — 76498 UNLISTED MR PROCEDURE: CPT

## 2025-05-16 PROCEDURE — 72197 MRI PELVIS W/O & W/DYE: CPT | Mod: 26

## 2025-05-16 PROCEDURE — 76498P: CUSTOM | Mod: 26

## 2025-05-16 PROCEDURE — 72197 MRI PELVIS W/O & W/DYE: CPT

## 2025-05-16 PROCEDURE — A9585: CPT

## 2025-06-12 ENCOUNTER — NON-APPOINTMENT (OUTPATIENT)
Age: 80
End: 2025-06-12

## 2025-06-12 ENCOUNTER — APPOINTMENT (OUTPATIENT)
Dept: ORTHOPEDIC SURGERY | Facility: CLINIC | Age: 80
End: 2025-06-12
Payer: MEDICARE

## 2025-06-12 ENCOUNTER — RESULT REVIEW (OUTPATIENT)
Age: 80
End: 2025-06-12

## 2025-06-12 ENCOUNTER — OUTPATIENT (OUTPATIENT)
Dept: OUTPATIENT SERVICES | Facility: HOSPITAL | Age: 80
LOS: 1 days | End: 2025-06-12
Payer: MEDICARE

## 2025-06-12 VITALS
HEART RATE: 63 BPM | TEMPERATURE: 98.4 F | WEIGHT: 200 LBS | HEIGHT: 67 IN | DIASTOLIC BLOOD PRESSURE: 71 MMHG | BODY MASS INDEX: 31.39 KG/M2 | SYSTOLIC BLOOD PRESSURE: 126 MMHG

## 2025-06-12 DIAGNOSIS — Z98.890 OTHER SPECIFIED POSTPROCEDURAL STATES: Chronic | ICD-10-CM

## 2025-06-12 PROCEDURE — 73564 X-RAY EXAM KNEE 4 OR MORE: CPT

## 2025-06-12 PROCEDURE — 20610 DRAIN/INJ JOINT/BURSA W/O US: CPT | Mod: RT

## 2025-06-12 PROCEDURE — 73564 X-RAY EXAM KNEE 4 OR MORE: CPT | Mod: 26,RT

## 2025-09-02 ENCOUNTER — APPOINTMENT (OUTPATIENT)
Dept: UROLOGY | Facility: CLINIC | Age: 80
End: 2025-09-02
Payer: MEDICARE

## 2025-09-02 VITALS
RESPIRATION RATE: 16 BRPM | SYSTOLIC BLOOD PRESSURE: 144 MMHG | HEIGHT: 67 IN | OXYGEN SATURATION: 96 % | WEIGHT: 195 LBS | DIASTOLIC BLOOD PRESSURE: 75 MMHG | TEMPERATURE: 98 F | BODY MASS INDEX: 30.61 KG/M2 | HEART RATE: 59 BPM

## 2025-09-02 DIAGNOSIS — N13.8 BENIGN PROSTATIC HYPERPLASIA WITH LOWER URINARY TRACT SYMPMS: ICD-10-CM

## 2025-09-02 DIAGNOSIS — N40.0 BENIGN PROSTATIC HYPERPLASIA WITHOUT LOWER URINARY TRACT SYMPMS: ICD-10-CM

## 2025-09-02 DIAGNOSIS — N40.1 BENIGN PROSTATIC HYPERPLASIA WITH LOWER URINARY TRACT SYMPMS: ICD-10-CM

## 2025-09-02 DIAGNOSIS — N52.9 MALE ERECTILE DYSFUNCTION, UNSPECIFIED: ICD-10-CM

## 2025-09-02 DIAGNOSIS — R97.20 ELEVATED PROSTATE, SPECIFIC ANTIGEN [PSA]: ICD-10-CM

## 2025-09-02 PROCEDURE — 99214 OFFICE O/P EST MOD 30 MIN: CPT

## 2025-09-02 PROCEDURE — G2211 COMPLEX E/M VISIT ADD ON: CPT

## 2025-09-02 RX ORDER — SILDENAFIL 100 MG/1
100 TABLET, FILM COATED ORAL
Qty: 10 | Refills: 3 | Status: ACTIVE | COMMUNITY
Start: 2025-09-02 | End: 1900-01-01

## 2025-09-10 ENCOUNTER — APPOINTMENT (OUTPATIENT)
Dept: ORTHOPEDIC SURGERY | Facility: CLINIC | Age: 80
End: 2025-09-10
Payer: MEDICARE

## 2025-09-10 VITALS — WEIGHT: 196 LBS | BODY MASS INDEX: 30.76 KG/M2 | HEIGHT: 67 IN

## 2025-09-10 DIAGNOSIS — M17.11 UNILATERAL PRIMARY OSTEOARTHRITIS, RIGHT KNEE: ICD-10-CM

## 2025-09-10 PROCEDURE — G2211 COMPLEX E/M VISIT ADD ON: CPT

## 2025-09-10 PROCEDURE — 73564 X-RAY EXAM KNEE 4 OR MORE: CPT | Mod: RT

## 2025-09-10 PROCEDURE — 99214 OFFICE O/P EST MOD 30 MIN: CPT

## (undated) DEVICE — SYR LUER LOK 50CC

## (undated) DEVICE — BALLOON ENDOCAVITY 2X14CM

## (undated) DEVICE — PREP CHLORAPREP HI-LITE ORANGE 26ML

## (undated) DEVICE — SYR CATH TIP 2 OZ

## (undated) DEVICE — PLASTIC SOLUTION BOWL 160Z

## (undated) DEVICE — DRAPE MEDIUM SHEET 44" X 70"

## (undated) DEVICE — NDL MAX CORE 18G X 25CM

## (undated) DEVICE — GRID BRACHYTHERAPY EZ 18G

## (undated) DEVICE — SYR LUER LOK 10CC

## (undated) DEVICE — NDL BIOPSY CHIBA 22G X 20CM

## (undated) DEVICE — GLV 7.5 PROTEXIS (WHITE)

## (undated) DEVICE — WARMING BLANKET UPPER ADULT

## (undated) DEVICE — DRAPE TOWEL BLUE 17" X 24"

## (undated) DEVICE — NDL HYPO REGULAR BEVEL 25G X 1.5" (BLUE)

## (undated) DEVICE — DRSG TELFA 3 X 8

## (undated) DEVICE — VENODYNE/SCD SLEEVE CALF MEDIUM